# Patient Record
Sex: MALE | Race: OTHER | HISPANIC OR LATINO
[De-identification: names, ages, dates, MRNs, and addresses within clinical notes are randomized per-mention and may not be internally consistent; named-entity substitution may affect disease eponyms.]

---

## 2022-10-07 PROBLEM — Z00.00 ENCOUNTER FOR PREVENTIVE HEALTH EXAMINATION: Status: ACTIVE | Noted: 2022-10-07

## 2022-10-21 ENCOUNTER — APPOINTMENT (OUTPATIENT)
Dept: FAMILY MEDICINE | Facility: CLINIC | Age: 67
End: 2022-10-21

## 2024-03-08 ENCOUNTER — APPOINTMENT (OUTPATIENT)
Dept: GASTROENTEROLOGY | Facility: CLINIC | Age: 69
End: 2024-03-08

## 2024-09-05 ENCOUNTER — NON-APPOINTMENT (OUTPATIENT)
Age: 69
End: 2024-09-05

## 2024-09-06 ENCOUNTER — APPOINTMENT (OUTPATIENT)
Dept: FAMILY MEDICINE | Facility: CLINIC | Age: 69
End: 2024-09-06

## 2024-09-06 VITALS
OXYGEN SATURATION: 96 % | DIASTOLIC BLOOD PRESSURE: 66 MMHG | SYSTOLIC BLOOD PRESSURE: 109 MMHG | BODY MASS INDEX: 26.31 KG/M2 | HEIGHT: 62 IN | HEART RATE: 56 BPM | WEIGHT: 143 LBS | TEMPERATURE: 98.2 F

## 2024-09-06 DIAGNOSIS — Z87.438 PERSONAL HISTORY OF OTHER DISEASES OF MALE GENITAL ORGANS: ICD-10-CM

## 2024-09-06 DIAGNOSIS — N42.9 DISORDER OF PROSTATE, UNSPECIFIED: ICD-10-CM

## 2024-09-06 DIAGNOSIS — Z80.3 FAMILY HISTORY OF MALIGNANT NEOPLASM OF BREAST: ICD-10-CM

## 2024-09-06 DIAGNOSIS — Z12.11 ENCOUNTER FOR SCREENING FOR MALIGNANT NEOPLASM OF COLON: ICD-10-CM

## 2024-09-06 DIAGNOSIS — Z87.19 PERSONAL HISTORY OF OTHER DISEASES OF THE DIGESTIVE SYSTEM: ICD-10-CM

## 2024-09-06 DIAGNOSIS — Z00.01 ENCOUNTER FOR GENERAL ADULT MEDICAL EXAMINATION WITH ABNORMAL FINDINGS: ICD-10-CM

## 2024-09-06 DIAGNOSIS — K21.9 GASTRO-ESOPHAGEAL REFLUX DISEASE W/OUT ESOPHAGITIS: ICD-10-CM

## 2024-09-06 DIAGNOSIS — N64.4 MASTODYNIA: ICD-10-CM

## 2024-09-06 DIAGNOSIS — Z77.22 CONTACT WITH AND (SUSPECTED) EXPOSURE TO ENVIRONMENTAL TOBACCO SMOKE (ACUTE) (CHRONIC): ICD-10-CM

## 2024-09-06 PROCEDURE — 36415 COLL VENOUS BLD VENIPUNCTURE: CPT

## 2024-09-06 PROCEDURE — G0439: CPT

## 2024-09-06 RX ORDER — OMEPRAZOLE 40 MG/1
40 CAPSULE, DELAYED RELEASE ORAL
Qty: 30 | Refills: 3 | Status: ACTIVE | COMMUNITY
Start: 2024-09-06 | End: 1900-01-01

## 2024-09-06 NOTE — HISTORY OF PRESENT ILLNESS
[de-identified] : 68 years old male came to office to establish his care in our office.  Pt complained a year history of right breast enlargement, progressively getting worse. No pain or nipple discharge. Sister has breast cancer.  Pt also has acid reflux problems, taking prilosec OTC for it. Pt takes OTC Saw Palmetto 160 mg, 1 daily for prostate problems, frequency urination, helping him. Pt never saw urologist before.

## 2024-09-06 NOTE — HEALTH RISK ASSESSMENT
[Good] : ~his/her~  mood as  good [No] : In the past 12 months have you used drugs other than those required for medical reasons? No [No falls in past year] : Patient reported no falls in the past year [0] : 2) Feeling down, depressed, or hopeless: Not at all (0) [PHQ-2 Negative - No further assessment needed] : PHQ-2 Negative - No further assessment needed [Patient declined colonoscopy] : Patient declined colonoscopy [With Family] : lives with family [Employed] : employed [] :  [Fully functional (bathing, dressing, toileting, transferring, walking, feeding)] : Fully functional (bathing, dressing, toileting, transferring, walking, feeding) [Fully functional (using the telephone, shopping, preparing meals, housekeeping, doing laundry, using] : Fully functional and needs no help or supervision to perform IADLs (using the telephone, shopping, preparing meals, housekeeping, doing laundry, using transportation, managing medications and managing finances) [Designated Healthcare Proxy] : Designated healthcare proxy [Name: ___] : Health Care Proxy's Name: [unfilled]  [Relationship: ___] : Relationship: [unfilled] [Current] : Current [[id number]] : 0 [AdvancecareDate] : 9/6/24 [de-identified] : socially only in Summer

## 2024-09-06 NOTE — PHYSICAL EXAM
[No Acute Distress] : no acute distress [Well Nourished] : well nourished [Well Developed] : well developed [Well-Appearing] : well-appearing [Normal Sclera/Conjunctiva] : normal sclera/conjunctiva [PERRL] : pupils equal round and reactive to light [EOMI] : extraocular movements intact [Normal Outer Ear/Nose] : the outer ears and nose were normal in appearance [Normal Oropharynx] : the oropharynx was normal [No JVD] : no jugular venous distention [No Lymphadenopathy] : no lymphadenopathy [Supple] : supple [Thyroid Normal, No Nodules] : the thyroid was normal and there were no nodules present [No Respiratory Distress] : no respiratory distress  [No Accessory Muscle Use] : no accessory muscle use [Clear to Auscultation] : lungs were clear to auscultation bilaterally [Normal Rate] : normal rate  [Regular Rhythm] : with a regular rhythm [Normal S1, S2] : normal S1 and S2 [No Murmur] : no murmur heard [No Varicosities] : no varicosities [Pedal Pulses Present] : the pedal pulses are present [No Edema] : there was no peripheral edema [No Extremity Clubbing/Cyanosis] : no extremity clubbing/cyanosis [Soft] : abdomen soft [Non Tender] : non-tender [Non-distended] : non-distended [No Masses] : no abdominal mass palpated [No HSM] : no HSM [Normal Bowel Sounds] : normal bowel sounds [No CVA Tenderness] : no CVA  tenderness [No Spinal Tenderness] : no spinal tenderness [No Joint Swelling] : no joint swelling [Grossly Normal Strength/Tone] : grossly normal strength/tone [No Rash] : no rash [Coordination Grossly Intact] : coordination grossly intact [No Focal Deficits] : no focal deficits [Normal Gait] : normal gait [Deep Tendon Reflexes (DTR)] : deep tendon reflexes were 2+ and symmetric [Normal Affect] : the affect was normal [Normal Insight/Judgement] : insight and judgment were intact [de-identified] : Right breast: significantly enlarged, no mass palpated. Left breast wnl.

## 2024-09-06 NOTE — HISTORY OF PRESENT ILLNESS
[de-identified] : 68 years old male came to office to establish his care in our office.  Pt complained a year history of right breast enlargement, progressively getting worse. No pain or nipple discharge. Sister has breast cancer.  Pt also has acid reflux problems, taking prilosec OTC for it. Pt takes OTC Saw Palmetto 160 mg, 1 daily for prostate problems, frequency urination, helping him. Pt never saw urologist before.

## 2024-09-06 NOTE — REVIEW OF SYSTEMS
[Negative] : Heme/Lymph [Abdominal Pain] : no abdominal pain [Nausea] : no nausea [Diarrhea] : no diarrhea [Vomiting] : no vomiting [Heartburn] : heartburn [Incontinence] : no incontinence [Nocturia] : nocturia [Hematuria] : no hematuria [Frequency] : frequency

## 2024-09-06 NOTE — PHYSICAL EXAM
[No Acute Distress] : no acute distress [Well Nourished] : well nourished [Well Developed] : well developed [Well-Appearing] : well-appearing [Normal Sclera/Conjunctiva] : normal sclera/conjunctiva [PERRL] : pupils equal round and reactive to light [EOMI] : extraocular movements intact [Normal Outer Ear/Nose] : the outer ears and nose were normal in appearance [Normal Oropharynx] : the oropharynx was normal [No JVD] : no jugular venous distention [No Lymphadenopathy] : no lymphadenopathy [Supple] : supple [Thyroid Normal, No Nodules] : the thyroid was normal and there were no nodules present [No Respiratory Distress] : no respiratory distress  [No Accessory Muscle Use] : no accessory muscle use [Clear to Auscultation] : lungs were clear to auscultation bilaterally [Normal Rate] : normal rate  [Regular Rhythm] : with a regular rhythm [Normal S1, S2] : normal S1 and S2 [No Murmur] : no murmur heard [No Varicosities] : no varicosities [No Edema] : there was no peripheral edema [Pedal Pulses Present] : the pedal pulses are present [No Extremity Clubbing/Cyanosis] : no extremity clubbing/cyanosis [Soft] : abdomen soft [Non-distended] : non-distended [Non Tender] : non-tender [No Masses] : no abdominal mass palpated [No HSM] : no HSM [Normal Bowel Sounds] : normal bowel sounds [No CVA Tenderness] : no CVA  tenderness [No Spinal Tenderness] : no spinal tenderness [No Joint Swelling] : no joint swelling [Grossly Normal Strength/Tone] : grossly normal strength/tone [No Rash] : no rash [Coordination Grossly Intact] : coordination grossly intact [No Focal Deficits] : no focal deficits [Normal Gait] : normal gait [Deep Tendon Reflexes (DTR)] : deep tendon reflexes were 2+ and symmetric [Normal Affect] : the affect was normal [Normal Insight/Judgement] : insight and judgment were intact [de-identified] : Right breast: significantly enlarged, no mass palpated. Left breast wnl.

## 2024-09-20 ENCOUNTER — APPOINTMENT (OUTPATIENT)
Dept: FAMILY MEDICINE | Facility: CLINIC | Age: 69
End: 2024-09-20
Payer: MEDICARE

## 2024-09-20 DIAGNOSIS — Z23 ENCOUNTER FOR IMMUNIZATION: ICD-10-CM

## 2024-09-20 PROCEDURE — 90662 IIV NO PRSV INCREASED AG IM: CPT

## 2024-09-20 PROCEDURE — G0008: CPT

## 2024-09-20 NOTE — HISTORY OF PRESENT ILLNESS
[de-identified] : Pt came to office for flu shot. Pt had flu shot before, no adverse reaction to flu vaccine.

## 2024-09-26 ENCOUNTER — APPOINTMENT (OUTPATIENT)
Dept: FAMILY MEDICINE | Facility: CLINIC | Age: 69
End: 2024-09-26

## 2024-09-26 VITALS — SYSTOLIC BLOOD PRESSURE: 118 MMHG | TEMPERATURE: 98 F | DIASTOLIC BLOOD PRESSURE: 73 MMHG

## 2024-09-26 DIAGNOSIS — E03.9 HYPOTHYROIDISM, UNSPECIFIED: ICD-10-CM

## 2024-09-26 DIAGNOSIS — N18.9 CHRONIC KIDNEY DISEASE, UNSPECIFIED: ICD-10-CM

## 2024-09-26 DIAGNOSIS — M10.00 IDIOPATHIC GOUT, UNSPECIFIED SITE: ICD-10-CM

## 2024-09-26 DIAGNOSIS — E78.2 MIXED HYPERLIPIDEMIA: ICD-10-CM

## 2024-09-26 DIAGNOSIS — N63.10 UNSPECIFIED LUMP IN THE RIGHT BREAST, UNSPECIFIED QUADRANT: ICD-10-CM

## 2024-09-26 PROCEDURE — G2211 COMPLEX E/M VISIT ADD ON: CPT

## 2024-09-26 PROCEDURE — 99214 OFFICE O/P EST MOD 30 MIN: CPT

## 2024-09-26 RX ORDER — ALLOPURINOL 100 MG/1
100 TABLET ORAL DAILY
Qty: 30 | Refills: 3 | Status: ACTIVE | COMMUNITY
Start: 2024-09-26 | End: 1900-01-01

## 2024-09-26 RX ORDER — LEVOTHYROXINE SODIUM 0.03 MG/1
25 TABLET ORAL DAILY
Qty: 30 | Refills: 3 | Status: ACTIVE | COMMUNITY
Start: 2024-09-26 | End: 1900-01-01

## 2024-09-26 RX ORDER — COLCHICINE 0.6 MG/1
0.6 TABLET ORAL
Qty: 30 | Refills: 3 | Status: ACTIVE | COMMUNITY
Start: 2024-09-26 | End: 1900-01-01

## 2024-09-26 NOTE — HISTORY OF PRESENT ILLNESS
[de-identified] : 68 years old male with past medical history of BPH and GERD complained right breast pain. Mammogram and breast sonogram showed large mass identified within the right breast. It is beyond the field of view of mammogram. MRI of the breast with contrast recommended.  Pt also had blood and urine test results Creatinine was 1.63, GFR 45, total cholesterol 200, , , and TSH 8.12.  Results was reviewed with pt in details today. Other test results were wnl. Pt has frequent joint pain, most commonly at ankle area. The doctor [place] country used to give him Colchicine. Over here pt takes ibuprofen every day for pain, sometimes three times daily, other times once daily. Pt is have left ankle pain and swelling now.

## 2024-10-11 ENCOUNTER — OUTPATIENT (OUTPATIENT)
Dept: OUTPATIENT SERVICES | Facility: HOSPITAL | Age: 69
LOS: 1 days | End: 2024-10-11
Payer: MEDICARE

## 2024-10-11 ENCOUNTER — APPOINTMENT (OUTPATIENT)
Dept: MRI IMAGING | Facility: IMAGING CENTER | Age: 69
End: 2024-10-11

## 2024-10-11 DIAGNOSIS — N63.10 UNSPECIFIED LUMP IN THE RIGHT BREAST, UNSPECIFIED QUADRANT: ICD-10-CM

## 2024-10-11 PROCEDURE — 77048 MRI BREAST C-+ W/CAD UNI: CPT | Mod: 26,RT

## 2024-10-11 PROCEDURE — A9585: CPT

## 2024-10-11 PROCEDURE — C8905: CPT

## 2024-10-11 PROCEDURE — C8937: CPT

## 2024-10-15 ENCOUNTER — APPOINTMENT (OUTPATIENT)
Dept: FAMILY MEDICINE | Facility: CLINIC | Age: 69
End: 2024-10-15
Payer: MEDICARE

## 2024-10-15 DIAGNOSIS — R22.2 LOCALIZED SWELLING, MASS AND LUMP, TRUNK: ICD-10-CM

## 2024-10-15 PROCEDURE — 99441: CPT

## 2024-10-15 NOTE — HISTORY OF PRESENT ILLNESS
[de-identified] : MRI of breast with and without contrast showed right chest wall mass of uncertain etiology. The differential diagnosis includes, but is not limited to, benign etiology such as a hematoma, or a neoplastic process, either benign or malignant, including rhabdomyosarcoma. No MRI evidence of malignancy of the left breast. Ultrasound guided biopsy of the mass is recommended. Results were discussed with pt's daughter Joesph in details today.

## 2024-10-15 NOTE — END OF VISIT
[FreeTextEntry3] : Pt's daughter was fully explained pt's diagnosis, treatment plan and goal of treatment today on the phone for 10 minutes

## 2024-10-23 DIAGNOSIS — R92.8 OTHER ABNORMAL AND INCONCLUSIVE FINDINGS ON DIAGNOSTIC IMAGING OF BREAST: ICD-10-CM

## 2024-10-24 ENCOUNTER — APPOINTMENT (OUTPATIENT)
Dept: ULTRASOUND IMAGING | Facility: IMAGING CENTER | Age: 69
End: 2024-10-24
Payer: MEDICARE

## 2024-10-24 ENCOUNTER — APPOINTMENT (OUTPATIENT)
Dept: FAMILY MEDICINE | Facility: CLINIC | Age: 69
End: 2024-10-24

## 2024-10-24 ENCOUNTER — OUTPATIENT (OUTPATIENT)
Dept: OUTPATIENT SERVICES | Facility: HOSPITAL | Age: 69
LOS: 1 days | End: 2024-10-24
Payer: MEDICARE

## 2024-10-24 DIAGNOSIS — R22.2 LOCALIZED SWELLING, MASS AND LUMP, TRUNK: ICD-10-CM

## 2024-10-24 PROBLEM — R92.8 ABNORMAL MAMMOGRAM: Status: ACTIVE | Noted: 2024-10-24

## 2024-10-24 PROCEDURE — 88342 IMHCHEM/IMCYTCHM 1ST ANTB: CPT

## 2024-10-24 PROCEDURE — 76942 ECHO GUIDE FOR BIOPSY: CPT

## 2024-10-24 PROCEDURE — 88307 TISSUE EXAM BY PATHOLOGIST: CPT

## 2024-10-24 PROCEDURE — 88342 IMHCHEM/IMCYTCHM 1ST ANTB: CPT | Mod: 26

## 2024-10-24 PROCEDURE — 88307 TISSUE EXAM BY PATHOLOGIST: CPT | Mod: 26

## 2024-10-24 PROCEDURE — 88274 CYTOGENETICS 25-99: CPT

## 2024-10-24 PROCEDURE — 20206 BIOPSY MUSCLE PERQ NEEDLE: CPT

## 2024-10-24 PROCEDURE — 76942 ECHO GUIDE FOR BIOPSY: CPT | Mod: 26

## 2024-10-24 PROCEDURE — 88271 CYTOGENETICS DNA PROBE: CPT

## 2024-10-24 PROCEDURE — 88273 CYTOGENETICS 10-30: CPT

## 2024-11-04 LAB — CHROM ANALY INTERPHASE BLD FISH-IMP: SIGNIFICANT CHANGE UP

## 2024-11-15 ENCOUNTER — APPOINTMENT (OUTPATIENT)
Dept: FAMILY MEDICINE | Facility: CLINIC | Age: 69
End: 2024-11-15
Payer: MEDICARE

## 2024-11-15 DIAGNOSIS — C49.3 MALIGNANT NEOPLASM OF CONNECTIVE AND SOFT TISSUE OF THORAX: ICD-10-CM

## 2024-11-15 PROCEDURE — 99441: CPT

## 2024-11-15 NOTE — HISTORY OF PRESENT ILLNESS
[Home] : at home, [unfilled] , at the time of the visit. 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 Results were discussed with pt's daughter in details today

## 2024-11-26 ENCOUNTER — NON-APPOINTMENT (OUTPATIENT)
Age: 69
End: 2024-11-26

## 2024-11-26 ENCOUNTER — TRANSCRIPTION ENCOUNTER (OUTPATIENT)
Age: 69
End: 2024-11-26

## 2024-11-26 NOTE — DATA REVIEWED
[FreeTextEntry1] : 9/20/24 ([facility]) B/l dMMG/US- scattered parenchymal densities. In the left breast there is an at least 17 cm fat-containing mass noted. This is beyond the field-of-view of mammography and corresponds to the region of palpable concern.  US- Large mass greater than 12 cm is noted in the right breast. This corresponds to the region of palpable concern. This is difficult to accurately measure based on sonography. IMPRESSION:  Large mass identified within the right breast. This is beyond the field-of-view of mammography. MRI of the breast with contrast recommended. BI-RADS 0  10/11/24 ([facility]) MRI- Right chest wall 6 x 11 x 6 cm mass of uncertain etiology. The differential diagnosis includes, but is not limited to, benign etiology such as a hematoma, or a neoplastic process, either benign or malignant, including rhabdomyosarcoma. Rec US bx. 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

## 2024-11-26 NOTE — HISTORY OF PRESENT ILLNESS
[FreeTextEntry1] : Patient is a 68yo M, referred by Dr. 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 Patient initally palpated chest wall mass which promoted dx imaging performed 9/20/24 and showed large mass >12cm (difficult to measure based on US). 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  Denies prior breast surgeries or biopsies.?? Patient denies family history of breast cancer?? Denies famhx of ovarian cancer.?? Patient has not had genetic testing performed??

## 2024-11-26 NOTE — ASSESSMENT
[FreeTextEntry1] : Patient is a 70yo M here for initial evaluation of right chest wall well-differentiated liposarcoma. Patient initally palpated chest wall mass which promoted dx imaging performed 9/20/24 and showed large mass >12cm (difficult to measure based on US). 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

## 2024-11-27 ENCOUNTER — APPOINTMENT (OUTPATIENT)
Dept: CT IMAGING | Facility: HOSPITAL | Age: 69
End: 2024-11-27

## 2024-11-27 ENCOUNTER — NON-APPOINTMENT (OUTPATIENT)
Age: 69
End: 2024-11-27

## 2024-11-27 ENCOUNTER — APPOINTMENT (OUTPATIENT)
Dept: SURGICAL ONCOLOGY | Facility: CLINIC | Age: 69
End: 2024-11-27
Payer: MEDICARE

## 2024-11-27 ENCOUNTER — APPOINTMENT (OUTPATIENT)
Dept: MRI IMAGING | Facility: HOSPITAL | Age: 69
End: 2024-11-27

## 2024-11-27 VITALS
RESPIRATION RATE: 17 BRPM | HEART RATE: 62 BPM | DIASTOLIC BLOOD PRESSURE: 69 MMHG | SYSTOLIC BLOOD PRESSURE: 120 MMHG | HEIGHT: 62 IN | OXYGEN SATURATION: 97 % | WEIGHT: 143 LBS | BODY MASS INDEX: 26.31 KG/M2

## 2024-11-27 PROCEDURE — 99203 OFFICE O/P NEW LOW 30 MIN: CPT

## 2024-11-27 NOTE — HISTORY OF PRESENT ILLNESS
[de-identified] : Mr. Lc Garcia is a 69 year old male, referred by Dr. Jie Mao, regarding right chest wall liposarcoma. Pt is only Slovak speaking, prefers daughter to translate  Pt has a large palpable mass for 1.5 years. He noticed discomfort and small lump after doing pushups while in [facility]. The [facility] doctor told him he most likely pulled a muscle and as the mass grew, he did not seek medical care. He continues to complain of minimal pain. He has a family history of breast cancer in her sister who lives in [facility]. He is otherwise healthy.   DM/US on 9/20/2024 shows a large mass identified in the right breast. 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

## 2024-11-27 NOTE — ASSESSMENT
[FreeTextEntry1] : IMP: Lc is a 69 year old male who is newly diagnosed large right chest wall liposarcoma  PLAN: CT A/P ordered Plastics referral After CT is performed will schedule surgery and coordinate with Dr. Stovall and Dr. Clarke to perform right chest excision of liposarcoma.    All medical entries were at my, Dr. Enrico Wilson, direction. I have reviewed the chart and agree that the record accurately reflects my personal performance of the history, physical exam, assessment and plan. Our office Nurse Practitioner was present of the duration of the office visit.

## 2024-11-27 NOTE — HISTORY OF PRESENT ILLNESS
[de-identified] : Mr. Lc Garcia is a 69 year old male, referred by Dr. Jie Mao, regarding right chest wall liposarcoma. Pt is only Polish speaking, prefers daughter to translate  Pt has a large palpable mass for 1.5 years. He noticed discomfort and small lump after doing pushups while in [facility]. The [facility] doctor told him he most likely pulled a muscle and as the mass grew, he did not seek medical care. He continues to complain of minimal pain. He has a family history of breast cancer in her sister who lives in [facility]. He is otherwise healthy.   DM/US on 9/20/2024 shows a large mass identified in the right breast. 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

## 2024-11-27 NOTE — PHYSICAL EXAM
[Normal] : supple, no neck mass and thyroid not enlarged [Normal Neck Lymph Nodes] : normal neck lymph nodes  [Normal Supraclavicular Lymph Nodes] : normal supraclavicular lymph nodes [Normal Axillary Lymph Nodes] : normal axillary lymph nodes [Normal] : oriented to person, place and time, with appropriate affect [FreeTextEntry1] : SC present for exam  [de-identified] : Normal S1,S2. Regular rate and rhythm [de-identified] : Large soft right breast 10 cm mass [de-identified] : Clear breath sounds bilaterally with normal respiratory effort.

## 2024-11-27 NOTE — HISTORY OF PRESENT ILLNESS
[de-identified] : Mr. Lc Garcia is a 69 year old male, referred by Dr. Jie Mao, regarding right chest wall liposarcoma. Pt is only Frisian speaking, prefers daughter to translate  Pt has a large palpable mass for 1.5 years. He noticed discomfort and small lump after doing pushups while in [facility]. The [facility] doctor told him he most likely pulled a muscle and as the mass grew, he did not seek medical care. He continues to complain of minimal pain. He has a family history of breast cancer in her sister who lives in [facility]. He is otherwise healthy.   DM/US on 9/20/2024 shows a large mass identified in the right breast. 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

## 2024-11-27 NOTE — PHYSICAL EXAM
[Normal] : supple, no neck mass and thyroid not enlarged [Normal Neck Lymph Nodes] : normal neck lymph nodes  [Normal Supraclavicular Lymph Nodes] : normal supraclavicular lymph nodes [Normal Axillary Lymph Nodes] : normal axillary lymph nodes [Normal] : oriented to person, place and time, with appropriate affect [FreeTextEntry1] : SC present for exam  [de-identified] : Normal S1,S2. Regular rate and rhythm [de-identified] : Large soft right breast 10 cm mass [de-identified] : Clear breath sounds bilaterally with normal respiratory effort.

## 2024-11-27 NOTE — PHYSICAL EXAM
[Normal] : supple, no neck mass and thyroid not enlarged [Normal Neck Lymph Nodes] : normal neck lymph nodes  [Normal Supraclavicular Lymph Nodes] : normal supraclavicular lymph nodes [Normal Axillary Lymph Nodes] : normal axillary lymph nodes [Normal] : oriented to person, place and time, with appropriate affect [FreeTextEntry1] : SC present for exam  [de-identified] : Normal S1,S2. Regular rate and rhythm [de-identified] : Large soft right breast 10 cm mass [de-identified] : Clear breath sounds bilaterally with normal respiratory effort.

## 2024-12-02 ENCOUNTER — NON-APPOINTMENT (OUTPATIENT)
Age: 69
End: 2024-12-02

## 2024-12-02 ENCOUNTER — RESULT REVIEW (OUTPATIENT)
Age: 69
End: 2024-12-02

## 2024-12-03 ENCOUNTER — APPOINTMENT (OUTPATIENT)
Dept: BREAST CENTER | Facility: CLINIC | Age: 69
End: 2024-12-03

## 2024-12-03 ENCOUNTER — NON-APPOINTMENT (OUTPATIENT)
Age: 69
End: 2024-12-03

## 2024-12-05 ENCOUNTER — APPOINTMENT (OUTPATIENT)
Dept: BREAST CENTER | Facility: CLINIC | Age: 69
End: 2024-12-05

## 2024-12-05 ENCOUNTER — APPOINTMENT (OUTPATIENT)
Dept: PLASTIC SURGERY | Facility: CLINIC | Age: 69
End: 2024-12-05
Payer: MEDICARE

## 2024-12-05 ENCOUNTER — OUTPATIENT (OUTPATIENT)
Dept: OUTPATIENT SERVICES | Facility: HOSPITAL | Age: 69
LOS: 1 days | End: 2024-12-05
Payer: MEDICARE

## 2024-12-05 ENCOUNTER — APPOINTMENT (OUTPATIENT)
Dept: CT IMAGING | Facility: IMAGING CENTER | Age: 69
End: 2024-12-05
Payer: MEDICARE

## 2024-12-05 VITALS
WEIGHT: 143 LBS | TEMPERATURE: 98 F | DIASTOLIC BLOOD PRESSURE: 78 MMHG | HEIGHT: 62 IN | OXYGEN SATURATION: 98 % | HEART RATE: 64 BPM | BODY MASS INDEX: 26.31 KG/M2 | SYSTOLIC BLOOD PRESSURE: 126 MMHG

## 2024-12-05 DIAGNOSIS — C49.3 MALIGNANT NEOPLASM OF CONNECTIVE AND SOFT TISSUE OF THORAX: ICD-10-CM

## 2024-12-05 DIAGNOSIS — R22.2 LOCALIZED SWELLING, MASS AND LUMP, TRUNK: ICD-10-CM

## 2024-12-05 PROCEDURE — 74177 CT ABD & PELVIS W/CONTRAST: CPT

## 2024-12-05 PROCEDURE — 99204 OFFICE O/P NEW MOD 45 MIN: CPT

## 2024-12-05 PROCEDURE — 74177 CT ABD & PELVIS W/CONTRAST: CPT | Mod: 26

## 2024-12-08 NOTE — REASON FOR VISIT
[Consultation] : a consultation visit [Spouse] : spouse [Family Member] : family member [FreeTextEntry1] : Dr. Wilson

## 2024-12-08 NOTE — HISTORY OF PRESENT ILLNESS
[FreeTextEntry1] : REFUGIO ALCANTARA is a 69 year old M who presents for initial consultation for reconstructive options after planned excision of right chest wall liposarcoma.   Patient is accompanied by his wife and daughter.  Patient reports first noticing a palpable mass to the right chest wall approximately 1 year ago, however in the past few months it has significantly grown in size. He initially saw a doctor in [facility] [place]) and was recommended conservative management. Patient denies trauma or injury to the area, denies tenderness or pain, no bleeding or drainage. At this time, denies cp, sob, subjective fever, chills, headache, cough, myalgia, malaise, abd pain, n/v/d, decreased appetite, and generalized weakness. diagnostic mammo/sono done 9/20/24 notable for large mass identified in the right breast, BIRADS 0. MRI breast done 10/11/24 notable for 16 x 11 x 6cm mass in the right chest wall of uncertain etiology. s/p US soft tissue biopsy 10/24/24, path notable for atypical lipomatous tumor/well-differentiated liposarcoma. MDM2 posistive.  Patient was evaluated by surg/onc Dr. Wilson, who recommended surgical excision.   PMHx: BPH, GERD, gout, hypothyroidism PSHx: appendectomy, cholecystectomy (1998) Family hx: sister +breast cancer Allergies: NKDA Current meds: allopurinol, colchicine, levothyroxine, omeprazole  Social hx: occasional ETOH, cigarette smoker

## 2024-12-08 NOTE — ADDENDUM
[FreeTextEntry1] :  This note was authored by Mariah Marrufo working as a scribe for Dr.Victor Clarke. I, Dr. Vitaliy Clarke have reviewed the content of this note and confirm it is true and accurate. I personally performed the history and physical examination and made all the decisions 12/05/2024   I, Clement Clarke NP, am scribing for and in the presence of Dr. Vitaliy Clarke MD, the following sections: HISTORY OF PRESENT ILLNESS, PAST MEDICAL/FAMILY/SOCIAL HISTORY, REVIEW OF SYSTEMS, VITRAL SIGNS, PHYSICAL EXAM, & DISPOSITION.  I personally performed the services described in the documentation, reviewed the documentation recorded by the scribe in my presence and it accurately and completely records my words and actions.

## 2024-12-08 NOTE — HISTORY OF PRESENT ILLNESS
[FreeTextEntry1] : REFUGIO ALCANTARA is a 69 year old M who presents for initial consultation for reconstructive options after planned excision of right chest wall liposarcoma.   Patient is accompanied by his wife and daughter.  Patient reports first noticing a palpable mass to the right chest wall approximately 1 year ago, however in the past few months it has significantly grown in size. He initially saw a doctor in [facility] ([place] country) and was recommended conservative management. Patient denies trauma or injury to the area, denies tenderness or pain, no bleeding or drainage. At this time, denies cp, sob, subjective fever, chills, headache, cough, myalgia, malaise, abd pain, n/v/d, decreased appetite, and generalized weakness. diagnostic mammo/sono done 9/20/24 notable for large mass identified in the right breast, BIRADS 0. MRI breast done 10/11/24 notable for 16 x 11 x 6cm mass in the right chest wall of uncertain etiology. s/p US soft tissue biopsy 10/24/24, path notable for atypical lipomatous tumor/well-differentiated liposarcoma. MDM2 posistive.  Patient was evaluated by surg/onc Dr. Wilson, who recommended surgical excision.   PMHx: BPH, GERD, gout, hypothyroidism PSHx: appendectomy, cholecystectomy (1998) Family hx: sister +breast cancer Allergies: NKDA Current meds: allopurinol, colchicine, levothyroxine, omeprazole  Social hx: occasional ETOH, cigarette smoker

## 2024-12-08 NOTE — HISTORY OF PRESENT ILLNESS
[FreeTextEntry1] : REFUGIO ALCANTARA is a 69 year old M who presents for initial consultation for reconstructive options after planned excision of right chest wall liposarcoma.   Patient is accompanied by his wife and daughter.  Patient reports first noticing a palpable mass to the right chest wall approximately 1 year ago, however in the past few months it has significantly grown in size. He initially saw a doctor in [facility] ([place]) and was recommended conservative management. Patient denies trauma or injury to the area, denies tenderness or pain, no bleeding or drainage. At this time, denies cp, sob, subjective fever, chills, headache, cough, myalgia, malaise, abd pain, n/v/d, decreased appetite, and generalized weakness. diagnostic mammo/sono done 9/20/24 notable for large mass identified in the right breast, BIRADS 0. MRI breast done 10/11/24 notable for 16 x 11 x 6cm mass in the right chest wall of uncertain etiology. s/p US soft tissue biopsy 10/24/24, path notable for atypical lipomatous tumor/well-differentiated liposarcoma. MDM2 posistive.  Patient was evaluated by surg/onc Dr. Wilson, who recommended surgical excision.   PMHx: BPH, GERD, gout, hypothyroidism PSHx: appendectomy, cholecystectomy (1998) Family hx: sister +breast cancer Allergies: NKDA Current meds: allopurinol, colchicine, levothyroxine, omeprazole  Social hx: occasional ETOH, cigarette smoker

## 2024-12-08 NOTE — HISTORY OF PRESENT ILLNESS
[FreeTextEntry1] : REFUGIO ALCANTARA is a 69 year old M who presents for initial consultation for reconstructive options after planned excision of right chest wall liposarcoma.   Patient is accompanied by his wife and daughter.  Patient reports first noticing a palpable mass to the right chest wall approximately 1 year ago, however in the past few months it has significantly grown in size. He initially saw a doctor in [facility] ([place]) and was recommended conservative management. Patient denies trauma or injury to the area, denies tenderness or pain, no bleeding or drainage. At this time, denies cp, sob, subjective fever, chills, headache, cough, myalgia, malaise, abd pain, n/v/d, decreased appetite, and generalized weakness. diagnostic mammo/sono done 9/20/24 notable for large mass identified in the right breast, BIRADS 0. MRI breast done 10/11/24 notable for 16 x 11 x 6cm mass in the right chest wall of uncertain etiology. s/p US soft tissue biopsy 10/24/24, path notable for atypical lipomatous tumor/well-differentiated liposarcoma. MDM2 posistive.  Patient was evaluated by surg/onc Dr. Wilson, who recommended surgical excision.   PMHx: BPH, GERD, gout, hypothyroidism PSHx: appendectomy, cholecystectomy (1998) Family hx: sister +breast cancer Allergies: NKDA Current meds: allopurinol, colchicine, levothyroxine, omeprazole  Social hx: occasional ETOH, cigarette smoker

## 2024-12-08 NOTE — ASSESSMENT
[FreeTextEntry1] : REFUGIO ALCANTARA is a 69 year old M who presents for initial consultation for reconstructive options after planned excision of right chest wall liposarcoma.  Exam findings and imaging reviewed with patient and his family members present.  I explained that following excision there will be a full thickness defect of the involved area.  The reconstructive options will be based on the defect size and surrounding tissue laxity of the involved area.  Primary closure is only possible for smaller defects.  For larger defects, local tissue rearrangement or skin grafting may be necessary.  The patient was explained the risks of each option. Risks following layered primary closure or local tissue rearrangement include wound dehiscence, contour irregularity, bleeding, infection, and paraesthesias.  Risks following skin grafting include wound dehiscence, skin graft nonadherence (partial or complete), contour irregularity, bleeding, infection, paraesthesias, and donor site complications.  Will discuss with Dr. Wilson the extent of liposarcoma involvement and whether chest wall skin can be salvaged.  Pending above discussion, re-evaluate for possible free tissue transfer for reconstruction vs other.  All questions answered.   - Will discuss with Dr. Wilson

## 2024-12-08 NOTE — HISTORY OF PRESENT ILLNESS
[FreeTextEntry1] : REFUGIO ALCANTARA is a 69 year old M who presents for initial consultation for reconstructive options after planned excision of right chest wall liposarcoma.   Patient is accompanied by his wife and daughter.  Patient reports first noticing a palpable mass to the right chest wall approximately 1 year ago, however in the past few months it has significantly grown in size. He initially saw a doctor in [facility] [place] and was recommended conservative management. Patient denies trauma or injury to the area, denies tenderness or pain, no bleeding or drainage. At this time, denies cp, sob, subjective fever, chills, headache, cough, myalgia, malaise, abd pain, n/v/d, decreased appetite, and generalized weakness. diagnostic mammo/sono done 9/20/24 notable for large mass identified in the right breast, BIRADS 0. MRI breast done 10/11/24 notable for 16 x 11 x 6cm mass in the right chest wall of uncertain etiology. s/p US soft tissue biopsy 10/24/24, path notable for atypical lipomatous tumor/well-differentiated liposarcoma. MDM2 posistive.  Patient was evaluated by surg/onc Dr. Wilson, who recommended surgical excision.   PMHx: BPH, GERD, gout, hypothyroidism PSHx: appendectomy, cholecystectomy (1998) Family hx: sister +breast cancer Allergies: NKDA Current meds: allopurinol, colchicine, levothyroxine, omeprazole  Social hx: occasional ETOH, cigarette smoker

## 2024-12-08 NOTE — PHYSICAL EXAM
[TextEntry] : Right chest: significant sized mass encompassing the entire chest wall, nontender to light and deep palpation, no regional adenopathy, no skin changes  Constitutional: NAD, well-nourished HENT: Normocephalic, atraumatic, PERRL, non-icteric sclerae, neck supple, trachea midline PULM: LSCTAB, no wheezing or rhonchi CV: RRR, no murmur, rubs, or gallops Abd: Soft, NT, ND, +BS, no palpable masses or bulge MSK: HAND Skin: No rash noted Neuro: A&O x 3, no FND Psych: Mood is appropriate

## 2024-12-08 NOTE — CONSULT LETTER
[Dear  ___] : Dear  [unfilled], [Consult Letter:] : I had the pleasure of evaluating your patient, [unfilled]. [Please see my note below.] : Please see my note below. [Consult Closing:] : Thank you very much for allowing me to participate in the care of this patient.  If you have any questions, please do not hesitate to contact me. [Sincerely,] : Sincerely, [( Thank you for referring [unfilled] for consultation for _____ )] : Thank you for referring [unfilled] for consultation for [unfilled] [FreeTextEntry3] : Vitaliy Clarke MD

## 2024-12-10 ENCOUNTER — APPOINTMENT (OUTPATIENT)
Dept: CT IMAGING | Facility: IMAGING CENTER | Age: 69
End: 2024-12-10
Payer: MEDICARE

## 2024-12-10 ENCOUNTER — OUTPATIENT (OUTPATIENT)
Dept: OUTPATIENT SERVICES | Facility: HOSPITAL | Age: 69
LOS: 1 days | End: 2024-12-10
Payer: MEDICARE

## 2024-12-10 DIAGNOSIS — C49.3 MALIGNANT NEOPLASM OF CONNECTIVE AND SOFT TISSUE OF THORAX: ICD-10-CM

## 2024-12-10 PROCEDURE — 71260 CT THORAX DX C+: CPT

## 2024-12-10 PROCEDURE — 71260 CT THORAX DX C+: CPT | Mod: 26

## 2024-12-17 ENCOUNTER — APPOINTMENT (OUTPATIENT)
Dept: THORACIC SURGERY | Facility: CLINIC | Age: 69
End: 2024-12-17
Payer: MEDICARE

## 2024-12-17 VITALS
HEART RATE: 64 BPM | WEIGHT: 145 LBS | OXYGEN SATURATION: 96 % | DIASTOLIC BLOOD PRESSURE: 74 MMHG | BODY MASS INDEX: 25.69 KG/M2 | HEIGHT: 63 IN | SYSTOLIC BLOOD PRESSURE: 125 MMHG | RESPIRATION RATE: 16 BRPM

## 2024-12-17 DIAGNOSIS — C49.3 MALIGNANT NEOPLASM OF CONNECTIVE AND SOFT TISSUE OF THORAX: ICD-10-CM

## 2024-12-17 PROCEDURE — 99204 OFFICE O/P NEW MOD 45 MIN: CPT

## 2024-12-17 NOTE — ASSESSMENT
[FreeTextEntry1] : Mr. REFUGIO ALCANTARA, 69 year old male, former social smoker quit 2023, w/ hx of BPH, GERD, gout, hypothyroidism Now, w/ right chest wall liposarcoma. Known for approximately 1 year, however has significantly enlarged within the past few months.  MR Breast w/wo IV contrast, right w/ CAD on 10/11/24:  - Right chest wall mass of uncertain etiology. The differential diagnosis includes, but is not limited to, benign etiology such as a hematoma, or a neoplastic process, either benign or malignant, including rhabdomyosarcoma.   US Biopsy of right chest wall mass on 10/24/24. Path: Atypical lipomatous tumor/well-differentiated liposarcoma. FISH result: Positive.   CT Chest with IV Contrast on 12/10/24:  - 6 x 7.3 x 17.3 cm heterogeneous fat-containing mass located deep to the right pectoralis major muscle compatible with liposarcoma. (2/34, 602/56).  - Multiple adjacent subcentimeter axillary lymph nodes are noted, similar to contralateral left axilla lymph nodes - Adjacent pectoralis minor muscle, vascular structures and ribs are intact..  OF NOTE:  Patient was evaluated by surg/onc Dr. Wilson, who recommended surgical excision. Consulted Dr. Clarke on 12/5  I have reviewed the patient's medical records and diagnostic images at time of this office consultation and have made the following recommendation: 1. Imaging and path reviewed. On PE large chest wall mass noted to his right upper chest area. Recommended right chest wall resection with Dr. Wilson. Will be on standby for surgery. 2. Pre-op clearances by Dr. Wilson.  I, GHASSAN MeyerIM, personally performed the evaluation and management (E/M) services for this new patient. That E/M includes conducting the initial examination, assessing all conditions, and establishing the plan of care. Today, my ACP, EBENEZER Chaney was here to observe my evaluation and management services for this patient to be followed going forward.

## 2024-12-17 NOTE — PHYSICAL EXAM
[Fully active, able to carry on all pre-disease performance without restriction] : Status 0 - Fully active, able to carry on all pre-disease performance without restriction [General Appearance - Alert] : alert [General Appearance - In No Acute Distress] : in no acute distress [Sclera] : the sclera and conjunctiva were normal [Hearing Threshold Finger Rub Not [place]] : hearing was normal [Outer Ear] : the ears and nose were normal in appearance [Neck Appearance] : the appearance of the neck was normal [Auscultation Breath Sounds / Voice Sounds] : lungs were clear to auscultation bilaterally [Heart Rate And Rhythm] : heart rate was normal and rhythm regular [2+] : left 2+ [Bowel Sounds] : normal bowel sounds [Abdomen Soft] : soft [Cervical Lymph Nodes Enlarged Posterior Bilaterally] : posterior cervical [Cervical Lymph Nodes Enlarged Anterior Bilaterally] : anterior cervical [No CVA Tenderness] : no ~M costovertebral angle tenderness [No Spinal Tenderness] : no spinal tenderness [Abnormal Walk] : normal gait [Nail Clubbing] : no clubbing  or cyanosis of the fingernails [Musculoskeletal - Swelling] : no joint swelling seen [Motor Tone] : muscle strength and tone were normal [Skin Color & Pigmentation] : normal skin color and pigmentation [Skin Turgor] : normal skin turgor [] : no rash [Deep Tendon Reflexes (DTR)] : deep tendon reflexes were 2+ and symmetric [Sensation] : the sensory exam was normal to light touch and pinprick [No Focal Deficits] : no focal deficits [FreeTextEntry1] : right chest wall mass

## 2024-12-17 NOTE — HISTORY OF PRESENT ILLNESS
[FreeTextEntry1] : Mr. REFUGIO ALCANTARA, 69 year old male, former social smoker quit 2023, w/ hx of BPH, GERD, gout, hypothyroidism Now, w/ right chest wall liposarcoma. Known for approximately 1 year, however has significantly enlarged within the past few months.  MR Breast w/wo IV contrast, right w/ CAD on 10/11/24:  - Right chest wall mass of uncertain etiology. The differential diagnosis includes, but is not limited to, benign etiology such as a hematoma, or a neoplastic process, either benign or malignant, including rhabdomyosarcoma.   US Biopsy of right chest wall mass on 10/24/24. Path: Atypical lipomatous tumor/well-differentiated liposarcoma. FISH result: Positive.   CT Chest with IV Contrast on 12/10/24:  - 6 x 7.3 x 17.3 cm heterogeneous fat-containing mass located deep to the right pectoralis major muscle compatible with liposarcoma. (2/34, 602/56).  - Multiple adjacent subcentimeter axillary lymph nodes are noted, similar to contralateral left axilla lymph nodes - Adjacent pectoralis minor muscle, vascular structures and ribs are intact..  OF NOTE:  Patient was evaluated by surg/onc Dr. Wilson, who recommended surgical excision. Consulted Dr. Clarke on 12/5  Patient presents today for second opinion. Patient reports that he got hit by a ball to the right chest area while playing soccer 3-4 mons ago, denies tenderness or pain, no bleeding or drainage.

## 2024-12-17 NOTE — PHYSICAL EXAM
[Fully active, able to carry on all pre-disease performance without restriction] : Status 0 - Fully active, able to carry on all pre-disease performance without restriction [General Appearance - Alert] : alert [General Appearance - In No Acute Distress] : in no acute distress [Sclera] : the sclera and conjunctiva were normal [Outer Ear] : the ears and nose were normal in appearance [Hearing Threshold Finger Rub Not [place]] : hearing was normal [Neck Appearance] : the appearance of the neck was normal [Auscultation Breath Sounds / Voice Sounds] : lungs were clear to auscultation bilaterally [Heart Rate And Rhythm] : heart rate was normal and rhythm regular [2+] : left 2+ [Bowel Sounds] : normal bowel sounds [Abdomen Soft] : soft [Cervical Lymph Nodes Enlarged Posterior Bilaterally] : posterior cervical [Cervical Lymph Nodes Enlarged Anterior Bilaterally] : anterior cervical [No CVA Tenderness] : no ~M costovertebral angle tenderness [No Spinal Tenderness] : no spinal tenderness [Abnormal Walk] : normal gait [Nail Clubbing] : no clubbing  or cyanosis of the fingernails [Musculoskeletal - Swelling] : no joint swelling seen [Motor Tone] : muscle strength and tone were normal [Skin Color & Pigmentation] : normal skin color and pigmentation [Skin Turgor] : normal skin turgor [] : no rash [Deep Tendon Reflexes (DTR)] : deep tendon reflexes were 2+ and symmetric [Sensation] : the sensory exam was normal to light touch and pinprick [No Focal Deficits] : no focal deficits [FreeTextEntry1] : right chest wall mass

## 2025-01-06 ENCOUNTER — APPOINTMENT (OUTPATIENT)
Dept: PLASTIC SURGERY | Facility: CLINIC | Age: 70
End: 2025-01-06
Payer: MEDICARE

## 2025-01-06 VITALS
WEIGHT: 136 LBS | HEART RATE: 63 BPM | DIASTOLIC BLOOD PRESSURE: 74 MMHG | SYSTOLIC BLOOD PRESSURE: 131 MMHG | TEMPERATURE: 98.2 F | HEIGHT: 62.5 IN | OXYGEN SATURATION: 99 % | BODY MASS INDEX: 24.4 KG/M2

## 2025-01-06 DIAGNOSIS — R22.2 LOCALIZED SWELLING, MASS AND LUMP, TRUNK: ICD-10-CM

## 2025-01-06 PROCEDURE — 99213 OFFICE O/P EST LOW 20 MIN: CPT

## 2025-01-06 NOTE — ASSESSMENT
[FreeTextEntry1] : REFUGIO ALCANTARA is a 69 year old M who presents for follow up visit for reconstructive options after planned excision of right chest wall liposarcoma.  Patient presents for pre-op eval for reconstruction of right chest wall, possible local flap, possible skin graft, possible integra.   Risks, benefits, and alternatives to surgery were reviewed and routine gretel-operative course described, which include but are not limited to infection, bleeding, recurrence, seroma, asymmetry, deformity, scarring, paresthesia, wound dehiscence, etc. Patient expressed understanding and wishes to proceed. Will proceed with surgical planning.  - Plan for reconstruction of right chest wall, possible local flap, possible skin graft, possible integra, tentatively 1/13/25 - Our office will coordinate with [facility] - Surgical paperwork submitted

## 2025-01-06 NOTE — ADDENDUM
[FreeTextEntry1] : I, Clement Clarke, NP, am scribing for and in the presence of Dr. Vitaliy Clarke MD, the following sections: HISTORY OF PRESENT ILLNESS, PAST MEDICAL/FAMILY/SOCIAL HISTORY, REVIEW OF SYSTEMS, VITAL SIGNS, PHYSICAL EXAM, & DISPOSITION.   I personally performed the services described in the documentation, reviewed the documentation recorded by the NP/scribe in my presence and it accurately and completely records my words and actions.

## 2025-01-06 NOTE — HISTORY OF PRESENT ILLNESS
[FreeTextEntry1] : REFUGIO ALCANTARA is a 69 year old M who presents for follow up visit for reconstructive options after planned excision of right chest wall liposarcoma.  Patient presents for pre-op eval for reconstruction of right chest wall, possible local flap, possible skin graft, possible integra.   Patient is accompanied by his wife, Dawna, and his mother.  Patient had a CT chest w/ IV contrast 12/10/24, notable for 6 x 7.3 x 17x3cm heterogeneous fat-containing mass located deep to the right pectoralis major muscle c/w liposarcoma, multiple adjacent subcentimeter axillary LN are noted, similar to contralateral left axilla lymph nodes, adjacent pectoralis minor muscle, vascular structures, and ribs are intact.  Patient is planned for surgery 1/13/25, offers no acute complaints, denies cp, sob, subjective fever, chills, headache, cough, myalgia, malaise, abd pain, n/v/d, decreased appetite, and generalized weakness.

## 2025-01-08 ENCOUNTER — APPOINTMENT (OUTPATIENT)
Dept: CARDIOLOGY | Facility: CLINIC | Age: 70
End: 2025-01-08
Payer: MEDICARE

## 2025-01-08 ENCOUNTER — APPOINTMENT (OUTPATIENT)
Dept: FAMILY MEDICINE | Facility: CLINIC | Age: 70
End: 2025-01-08
Payer: MEDICARE

## 2025-01-08 ENCOUNTER — NON-APPOINTMENT (OUTPATIENT)
Age: 70
End: 2025-01-08

## 2025-01-08 ENCOUNTER — LABORATORY RESULT (OUTPATIENT)
Age: 70
End: 2025-01-08

## 2025-01-08 VITALS
BODY MASS INDEX: 24.22 KG/M2 | SYSTOLIC BLOOD PRESSURE: 118 MMHG | RESPIRATION RATE: 16 BRPM | HEIGHT: 62.5 IN | OXYGEN SATURATION: 97 % | TEMPERATURE: 97.8 F | DIASTOLIC BLOOD PRESSURE: 66 MMHG | HEART RATE: 68 BPM | WEIGHT: 135 LBS

## 2025-01-08 VITALS
SYSTOLIC BLOOD PRESSURE: 118 MMHG | OXYGEN SATURATION: 97 % | TEMPERATURE: 97.8 F | HEART RATE: 68 BPM | BODY MASS INDEX: 24.22 KG/M2 | WEIGHT: 135 LBS | DIASTOLIC BLOOD PRESSURE: 67 MMHG | HEIGHT: 62.5 IN

## 2025-01-08 DIAGNOSIS — Z01.810 ENCOUNTER FOR PREPROCEDURAL CARDIOVASCULAR EXAMINATION: ICD-10-CM

## 2025-01-08 DIAGNOSIS — R94.31 ABNORMAL ELECTROCARDIOGRAM [ECG] [EKG]: ICD-10-CM

## 2025-01-08 PROCEDURE — 99214 OFFICE O/P EST MOD 30 MIN: CPT

## 2025-01-08 PROCEDURE — 99204 OFFICE O/P NEW MOD 45 MIN: CPT

## 2025-01-08 PROCEDURE — 93000 ELECTROCARDIOGRAM COMPLETE: CPT | Mod: 59

## 2025-01-08 PROCEDURE — 93306 TTE W/DOPPLER COMPLETE: CPT

## 2025-01-08 PROCEDURE — 36415 COLL VENOUS BLD VENIPUNCTURE: CPT

## 2025-01-08 NOTE — HISTORY OF PRESENT ILLNESS
[FreeTextEntry1] : Removal right chest wall tumor [FreeTextEntry2] : 1/13/25 [FreeTextEntry4] : 68 years old male with past medical history of BPH, hyperlipidemia, and GERD came back for pre-surgical evaluation.

## 2025-01-09 ENCOUNTER — APPOINTMENT (OUTPATIENT)
Dept: CV DIAGNOSTICS | Facility: HOSPITAL | Age: 70
End: 2025-01-09

## 2025-01-09 ENCOUNTER — RESULT REVIEW (OUTPATIENT)
Age: 70
End: 2025-01-09

## 2025-01-09 ENCOUNTER — NON-APPOINTMENT (OUTPATIENT)
Age: 70
End: 2025-01-09

## 2025-01-09 ENCOUNTER — OUTPATIENT (OUTPATIENT)
Dept: OUTPATIENT SERVICES | Facility: HOSPITAL | Age: 70
LOS: 1 days | End: 2025-01-09

## 2025-01-09 ENCOUNTER — OUTPATIENT (OUTPATIENT)
Dept: OUTPATIENT SERVICES | Facility: HOSPITAL | Age: 70
LOS: 1 days | End: 2025-01-09
Payer: MEDICARE

## 2025-01-09 VITALS
TEMPERATURE: 97 F | HEIGHT: 61.5 IN | OXYGEN SATURATION: 99 % | WEIGHT: 136.03 LBS | HEART RATE: 62 BPM | DIASTOLIC BLOOD PRESSURE: 76 MMHG | RESPIRATION RATE: 16 BRPM | SYSTOLIC BLOOD PRESSURE: 116 MMHG

## 2025-01-09 DIAGNOSIS — E03.9 HYPOTHYROIDISM, UNSPECIFIED: ICD-10-CM

## 2025-01-09 DIAGNOSIS — C49.3 MALIGNANT NEOPLASM OF CONNECTIVE AND SOFT TISSUE OF THORAX: ICD-10-CM

## 2025-01-09 DIAGNOSIS — R94.31 ABNORMAL ELECTROCARDIOGRAM [ECG] [EKG]: ICD-10-CM

## 2025-01-09 DIAGNOSIS — Z01.810 ENCOUNTER FOR PREPROCEDURAL CARDIOVASCULAR EXAMINATION: ICD-10-CM

## 2025-01-09 LAB
BLD GP AB SCN SERPL QL: NEGATIVE — SIGNIFICANT CHANGE UP
RH IG SCN BLD-IMP: POSITIVE — SIGNIFICANT CHANGE UP
RH IG SCN BLD-IMP: POSITIVE — SIGNIFICANT CHANGE UP

## 2025-01-09 PROCEDURE — 93018 CV STRESS TEST I&R ONLY: CPT | Mod: GC

## 2025-01-09 PROCEDURE — 93016 CV STRESS TEST SUPVJ ONLY: CPT | Mod: GC

## 2025-01-09 PROCEDURE — 78451 HT MUSCLE IMAGE SPECT SING: CPT | Mod: 26

## 2025-01-09 NOTE — H&P PST ADULT - PROBLEM SELECTOR PLAN 1
Scheduled excision right chest wall liposarcoma.  Dr Preston: resection right chest wall, possible local flap, possible skin graft, possible integra.  Dr Ulrich: on standby for right chest wall resection.  Written & verbal preop instructions, gi prophylaxis & surgical soap given  Pt verbalized good understanding.  Teach back done on surgical soap instructions. Scheduled excision right chest wall liposarcoma.  Dr Preston: resection right chest wall, possible local flap, possible skin graft, possible integra.  Dr Ulrich: on standby for right chest wall resection.  Written & verbal preop instructions, gi prophylaxis & surgical soap given  Pt verbalized good understanding.  Teach back done on surgical soap instructions.  Abnormal EKG with PCP referred to cardiology for eval. (alscript)  Pending copy of eval note

## 2025-01-09 NOTE — H&P PST ADULT - HISTORY OF PRESENT ILLNESS
69 year old Hebrew speaking male with pm hx of BPH, GERD, gout, hypothyroidism presents for preop eval for scheduled excision right chest wall liposarcoma.  Dr Preston: resection right chest wall, possible local flap, possible skin graft, possible integra.  Dr Ulrich: on standby for right chest wall resection.  Mammogram & sonogram done in 9/2024 abnormal right breast findings MRI & Biopsy done in October 2024.  As per Dr Ulrich note:  US Biopsy of right chest wall mass on 10/24/24. Path: Atypical lipomatous tumor/well-differentiated liposarcoma. FISH result: Positive.  CT Chest with IV Contrast on 12/10/24: CT Chest with IV Contrast on 12/10/24:  - 6 x 7.3 x 17.3 cm heterogeneous fat-containing mass located deep to the right pectoralis major muscle compatible with liposarcoma. (2/34, 602/56).  - Multiple adjacent subcentimeter axillary lymph nodes are noted, similar to contralateral left axilla lymph nodes

## 2025-01-09 NOTE — H&P PST ADULT - PROBLEM SELECTOR PLAN 2
Pt instructed to take levothyroxine on morning of surgery. Pt instructed to take levothyroxine on morning of surgery.  1/2025 cbc, bmp wnl (alscript) t&s done today

## 2025-01-09 NOTE — H&P PST ADULT - NSANTHOSAYNRD_GEN_A_CORE
No. LILLIE screening performed.  STOP BANG Legend: 0-2 = LOW Risk; 3-4 = INTERMEDIATE Risk; 5-8 = HIGH Risk

## 2025-01-09 NOTE — H&P PST ADULT - NEGATIVE GENERAL GENITOURINARY SYMPTOMS
no renal colic/no flank pain L/no flank pain R/no bladder infections/no dysuria/no urinary hesitancy/normal urinary frequency

## 2025-01-09 NOTE — H&P PST ADULT - NSICDXPASTMEDICALHX_GEN_ALL_CORE_FT
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

## 2025-01-10 ENCOUNTER — APPOINTMENT (OUTPATIENT)
Dept: FAMILY MEDICINE | Facility: CLINIC | Age: 70
End: 2025-01-10
Payer: MEDICARE

## 2025-01-10 DIAGNOSIS — M10.00 IDIOPATHIC GOUT, UNSPECIFIED SITE: ICD-10-CM

## 2025-01-10 DIAGNOSIS — E03.9 HYPOTHYROIDISM, UNSPECIFIED: ICD-10-CM

## 2025-01-10 DIAGNOSIS — N18.9 CHRONIC KIDNEY DISEASE, UNSPECIFIED: ICD-10-CM

## 2025-01-10 DIAGNOSIS — Z01.818 ENCOUNTER FOR OTHER PREPROCEDURAL EXAMINATION: ICD-10-CM

## 2025-01-10 LAB
ALBUMIN SERPL ELPH-MCNC: 4.5 G/DL
ALP BLD-CCNC: 102 U/L
ALT SERPL-CCNC: 19 U/L
ANION GAP SERPL CALC-SCNC: 12 MMOL/L
APPEARANCE: CLEAR
AST SERPL-CCNC: 24 U/L
BASOPHILS # BLD AUTO: 0.04 K/UL — SIGNIFICANT CHANGE UP (ref 0–0.2)
BASOPHILS # BLD AUTO: 0.05 K/UL
BASOPHILS NFR BLD AUTO: 0.6 % — SIGNIFICANT CHANGE UP (ref 0–2)
BASOPHILS NFR BLD AUTO: 0.7 %
BILIRUB SERPL-MCNC: 0.7 MG/DL
BILIRUBIN URINE: NEGATIVE
BLOOD URINE: NEGATIVE
BUN SERPL-MCNC: 20 MG/DL
CALCIUM SERPL-MCNC: 9.9 MG/DL
CHLORIDE SERPL-SCNC: 107 MMOL/L
CO2 SERPL-SCNC: 24 MMOL/L
COLOR: YELLOW
CREAT SERPL-MCNC: 1.62 MG/DL
EGFR: 45 ML/MIN/1.73M2
EOSINOPHIL # BLD AUTO: 0.16 K/UL
EOSINOPHIL # BLD AUTO: 0.18 K/UL — SIGNIFICANT CHANGE UP (ref 0–0.5)
EOSINOPHIL NFR BLD AUTO: 2.3 %
EOSINOPHIL NFR BLD AUTO: 2.7 % — SIGNIFICANT CHANGE UP (ref 0–6)
GLUCOSE QUALITATIVE U: NEGATIVE MG/DL
GLUCOSE SERPL-MCNC: 99 MG/DL
HCT VFR BLD CALC: 44.6 % — SIGNIFICANT CHANGE UP (ref 39–50)
HCT VFR BLD CALC: 46.2 %
HGB BLD-MCNC: 14.4 G/DL — SIGNIFICANT CHANGE UP (ref 13–17)
HGB BLD-MCNC: 14.7 G/DL
IMM GRANULOCYTES NFR BLD AUTO: 0.3 %
IMM GRANULOCYTES NFR BLD AUTO: 0.3 % — SIGNIFICANT CHANGE UP (ref 0–0.9)
INR PPP: 1.05 RATIO
KETONES URINE: NEGATIVE MG/DL
LEUKOCYTE ESTERASE URINE: ABNORMAL
LYMPHOCYTES # BLD AUTO: 1.71 K/UL
LYMPHOCYTES # BLD AUTO: 1.83 K/UL — SIGNIFICANT CHANGE UP (ref 1–3.3)
LYMPHOCYTES # BLD AUTO: 27.8 % — SIGNIFICANT CHANGE UP (ref 13–44)
LYMPHOCYTES NFR BLD AUTO: 24.2 %
MAN DIFF?: NORMAL
MCHC RBC-ENTMCNC: 29.8 PG
MCHC RBC-ENTMCNC: 29.9 PG — SIGNIFICANT CHANGE UP (ref 27–34)
MCHC RBC-ENTMCNC: 31.8 G/DL
MCHC RBC-ENTMCNC: 32.3 G/DL — SIGNIFICANT CHANGE UP (ref 32–36)
MCV RBC AUTO: 92.5 FL — SIGNIFICANT CHANGE UP (ref 80–100)
MCV RBC AUTO: 93.5 FL
MONOCYTES # BLD AUTO: 0.37 K/UL
MONOCYTES # BLD AUTO: 0.42 K/UL — SIGNIFICANT CHANGE UP (ref 0–0.9)
MONOCYTES NFR BLD AUTO: 5.2 %
MONOCYTES NFR BLD AUTO: 6.4 % — SIGNIFICANT CHANGE UP (ref 2–14)
NEUTROPHILS # BLD AUTO: 4.09 K/UL — SIGNIFICANT CHANGE UP (ref 1.8–7.4)
NEUTROPHILS # BLD AUTO: 4.76 K/UL
NEUTROPHILS NFR BLD AUTO: 62.2 % — SIGNIFICANT CHANGE UP (ref 43–77)
NEUTROPHILS NFR BLD AUTO: 67.3 %
NITRITE URINE: NEGATIVE
PH URINE: 5.5
PLATELET # BLD AUTO: 225 K/UL
PLATELET # BLD AUTO: 225 K/UL — SIGNIFICANT CHANGE UP (ref 150–400)
POTASSIUM SERPL-SCNC: 4.4 MMOL/L
PROT SERPL-MCNC: 7.2 G/DL
PROTEIN URINE: NEGATIVE MG/DL
PT BLD: 12.5 SEC
RBC # BLD: 4.82 M/UL — SIGNIFICANT CHANGE UP (ref 4.2–5.8)
RBC # BLD: 4.94 M/UL
RBC # FLD: 13.2 % — SIGNIFICANT CHANGE UP (ref 10.3–14.5)
RBC # FLD: 13.5 %
SODIUM SERPL-SCNC: 143 MMOL/L
SPECIFIC GRAVITY URINE: 1.02
T3 SERPL-MCNC: 94 NG/DL
T3FREE SERPL-MCNC: 2.48 PG/ML
T4 FREE SERPL-MCNC: 0.9 NG/DL
T4 SERPL-MCNC: 6.6 UG/DL
TSH SERPL-ACNC: 4.68 UIU/ML
URATE SERPL-MCNC: 7.1 MG/DL
UROBILINOGEN URINE: 0.2 MG/DL
WBC # BLD: 6.58 K/UL — SIGNIFICANT CHANGE UP (ref 3.8–10.5)
WBC # FLD AUTO: 6.58 K/UL — SIGNIFICANT CHANGE UP (ref 3.8–10.5)
WBC # FLD AUTO: 7.07 K/UL

## 2025-01-10 PROCEDURE — 99214 OFFICE O/P EST MOD 30 MIN: CPT

## 2025-01-10 PROCEDURE — G2211 COMPLEX E/M VISIT ADD ON: CPT

## 2025-01-10 NOTE — ASU PATIENT PROFILE, ADULT - ABILITY TO HEAR (WITH HEARING AID OR HEARING APPLIANCE IF NORMALLY USED):
left hard of hearing/Mildly to Moderately Impaired: difficulty hearing in some environments or speaker may need to increase volume or speak distinctly

## 2025-01-10 NOTE — DISCUSSION/SUMMARY
[FreeTextEntry1] : In a summary Mr. Karan Lopez, Lc is an- elderly- male with abnormal EKG showing anterolateral ischemia and Pre- op, Echo done showed normal LV systolic function. Echo results explained. Nuclear stress test to rule out ischemia. Further plan based on stress test results. Hypothyroidism, on Levothyroxine. spent 50 minutes on encounter.

## 2025-01-10 NOTE — PHYSICAL EXAM
[Normal Conjunctiva] : normal conjunctiva [No Carotid Bruit] : no carotid bruit [Soft] : abdomen soft [Non Tender] : non-tender [Normal Bowel Sounds] : normal bowel sounds [No Edema] : no edema [No Cyanosis] : no cyanosis [No Rash] : no rash [Normal] : alert and oriented, normal memory [de-identified] : right chest wall tumor.

## 2025-01-10 NOTE — ADDENDUM
[FreeTextEntry1] : Mr. Lc Walsh had nuclear stress test on 1/9/2025 which is negative for ischemia. With normal cardiac work up, he will be low risk cardiac wise for chest wall tumor removal cardiac jolley. Discussed with Dr. Mao and daughter Ms. Toney.

## 2025-01-10 NOTE — HISTORY OF PRESENT ILLNESS
[FreeTextEntry1] : Lc Roberts is a 69- year- old Norwegian speaking male(translated by daughter Ms. Toney) with history of Hypothyroidism, Gout and abnormal EKG comes for pre- op cardiac evaluation for removal of right chest wall tumor Liposarcoma. Denies any chest pain or palpitations. No shortness of breath on exertion. Physically active.

## 2025-01-10 NOTE — HISTORY OF PRESENT ILLNESS
[FreeTextEntry1] : Removal right chest wall tumor [FreeTextEntry2] : 01/13/25 [FreeTextEntry4] : 68 years old male with past medical history of BPH, hyperlipidemia, and GERD came back to review his most recent test results.  Creatinine was 1.63, GFR 45, uric acid 7.1, TSH 4.68, and UA positive for trace leukocyte esterase. Echocardiogram and nuclear stress test showed negative for ischemic changes Results was reviewed with pt in details today. Other test results were wnl Pt has frequent joint pain, most co

## 2025-01-10 NOTE — HISTORY OF PRESENT ILLNESS
[FreeTextEntry1] : Lc Robrets is a 69- year- old Libyan speaking male(translated by daughter Ms. Toney) with history of Hypothyroidism, Gout and abnormal EKG comes for pre- op cardiac evaluation for removal of right chest wall tumor Liposarcoma. Denies any chest pain or palpitations. No shortness of breath on exertion. Physically active.

## 2025-01-10 NOTE — REVIEW OF SYSTEMS
[Under Stress] : under stress [Negative] : Respiratory [Blurry Vision] : no blurred vision [Dyspnea on exertion] : not dyspnea during exertion [Chest Discomfort] : no chest discomfort [Lower Ext Edema] : no extremity edema [Palpitations] : no palpitations [Orthopnea] : no orthopnea [PND] : no PND [Abdominal Pain] : no abdominal pain [Nausea] : no nausea [Vomiting] : no vomiting [Heartburn] : no heartburn [Joint Pain] : no joint pain [Rash] : no rash [Itching] : no itching [Dizziness] : no dizziness [Tremor] : no tremor was seen [Confusion] : no confusion was observed [Easy Bleeding] : no tendency for easy bleeding [Easy Bruising] : no tendency for easy bruising

## 2025-01-10 NOTE — PHYSICAL EXAM
[Normal Conjunctiva] : normal conjunctiva [No Carotid Bruit] : no carotid bruit [Soft] : abdomen soft [Non Tender] : non-tender [Normal Bowel Sounds] : normal bowel sounds [No Edema] : no edema [No Cyanosis] : no cyanosis [No Rash] : no rash [Normal] : alert and oriented, normal memory [de-identified] : right chest wall tumor.

## 2025-01-13 ENCOUNTER — APPOINTMENT (OUTPATIENT)
Dept: PLASTIC SURGERY | Facility: HOSPITAL | Age: 70
End: 2025-01-13

## 2025-01-13 ENCOUNTER — RESULT REVIEW (OUTPATIENT)
Age: 70
End: 2025-01-13

## 2025-01-13 ENCOUNTER — APPOINTMENT (OUTPATIENT)
Dept: THORACIC SURGERY | Facility: HOSPITAL | Age: 70
End: 2025-01-13

## 2025-01-13 ENCOUNTER — APPOINTMENT (OUTPATIENT)
Dept: SURGICAL ONCOLOGY | Facility: HOSPITAL | Age: 70
End: 2025-01-13
Payer: MEDICARE

## 2025-01-13 ENCOUNTER — INPATIENT (INPATIENT)
Facility: HOSPITAL | Age: 70
LOS: 0 days | Discharge: HOME | End: 2025-01-14
Attending: SURGERY | Admitting: PLASTIC SURGERY
Payer: MEDICARE

## 2025-01-13 VITALS
TEMPERATURE: 98 F | HEART RATE: 60 BPM | WEIGHT: 136.03 LBS | HEIGHT: 61.5 IN | OXYGEN SATURATION: 100 % | DIASTOLIC BLOOD PRESSURE: 68 MMHG | SYSTOLIC BLOOD PRESSURE: 120 MMHG | RESPIRATION RATE: 18 BRPM

## 2025-01-13 DIAGNOSIS — C49.3 MALIGNANT NEOPLASM OF CONNECTIVE AND SOFT TISSUE OF THORAX: ICD-10-CM

## 2025-01-13 LAB
ANION GAP SERPL CALC-SCNC: 12 MMOL/L — SIGNIFICANT CHANGE UP (ref 7–14)
BUN SERPL-MCNC: 22 MG/DL — SIGNIFICANT CHANGE UP (ref 7–23)
CALCIUM SERPL-MCNC: 9.4 MG/DL — SIGNIFICANT CHANGE UP (ref 8.4–10.5)
CHLORIDE SERPL-SCNC: 105 MMOL/L — SIGNIFICANT CHANGE UP (ref 98–107)
CO2 SERPL-SCNC: 23 MMOL/L — SIGNIFICANT CHANGE UP (ref 22–31)
CREAT SERPL-MCNC: 1.57 MG/DL — HIGH (ref 0.5–1.3)
EGFR: 47 ML/MIN/1.73M2 — LOW
GLUCOSE SERPL-MCNC: 151 MG/DL — HIGH (ref 70–99)
MAGNESIUM SERPL-MCNC: 2 MG/DL — SIGNIFICANT CHANGE UP (ref 1.6–2.6)
PHOSPHATE SERPL-MCNC: 4.1 MG/DL — SIGNIFICANT CHANGE UP (ref 2.5–4.5)
POTASSIUM SERPL-MCNC: 4.4 MMOL/L — SIGNIFICANT CHANGE UP (ref 3.5–5.3)
POTASSIUM SERPL-SCNC: 4.4 MMOL/L — SIGNIFICANT CHANGE UP (ref 3.5–5.3)
SODIUM SERPL-SCNC: 140 MMOL/L — SIGNIFICANT CHANGE UP (ref 135–145)

## 2025-01-13 PROCEDURE — 88342 IMHCHEM/IMCYTCHM 1ST ANTB: CPT | Mod: 26

## 2025-01-13 PROCEDURE — 21601 EXC CHEST WALL TUMOR W/RIBS: CPT | Mod: 82,RT

## 2025-01-13 PROCEDURE — 21601 EXC CHEST WALL TUMOR W/RIBS: CPT | Mod: RT

## 2025-01-13 PROCEDURE — 14301 TIS TRNFR ANY 30.1-60 SQ CM: CPT

## 2025-01-13 PROCEDURE — 14302 TIS TRNFR ADDL 30 SQ CM: CPT

## 2025-01-13 PROCEDURE — 15734 MUSCLE-SKIN GRAFT TRUNK: CPT

## 2025-01-13 PROCEDURE — 88309 TISSUE EXAM BY PATHOLOGIST: CPT | Mod: 26

## 2025-01-13 DEVICE — AGENT HEMOSTATIC HEMOBLAST 1.65G 10CM: Type: IMPLANTABLE DEVICE | Site: RIGHT | Status: FUNCTIONAL

## 2025-01-13 DEVICE — LIGATING CLIPS WECK HORIZON MEDIUM (BLUE) 24: Type: IMPLANTABLE DEVICE | Site: RIGHT | Status: FUNCTIONAL

## 2025-01-13 RX ORDER — COLCHICINE 0.6 MG/1
0 CAPSULE ORAL
Refills: 0 | DISCHARGE

## 2025-01-13 RX ORDER — SODIUM CHLORIDE 9 MG/ML
1000 INJECTION, SOLUTION INTRAVENOUS
Refills: 0 | Status: DISCONTINUED | OUTPATIENT
Start: 2025-01-13 | End: 2025-01-13

## 2025-01-13 RX ORDER — HEPARIN SODIUM 1000 [USP'U]/ML
5000 INJECTION, SOLUTION INTRAVENOUS; SUBCUTANEOUS EVERY 8 HOURS
Refills: 0 | Status: DISCONTINUED | OUTPATIENT
Start: 2025-01-13 | End: 2025-01-14

## 2025-01-13 RX ORDER — ALLOPURINOL 100 MG/1
100 TABLET ORAL DAILY
Refills: 0 | Status: DISCONTINUED | OUTPATIENT
Start: 2025-01-13 | End: 2025-01-14

## 2025-01-13 RX ORDER — OXYCODONE HCL 15 MG
5 TABLET ORAL EVERY 4 HOURS
Refills: 0 | Status: DISCONTINUED | OUTPATIENT
Start: 2025-01-13 | End: 2025-01-14

## 2025-01-13 RX ORDER — ACETAMINOPHEN 80 MG/.8ML
1000 SOLUTION/ DROPS ORAL EVERY 6 HOURS
Refills: 0 | Status: COMPLETED | OUTPATIENT
Start: 2025-01-13 | End: 2025-01-14

## 2025-01-13 RX ORDER — COLCHICINE 0.6 MG/1
0.6 CAPSULE ORAL DAILY
Refills: 0 | Status: DISCONTINUED | OUTPATIENT
Start: 2025-01-13 | End: 2025-01-14

## 2025-01-13 RX ORDER — CEFAZOLIN SODIUM 1 G
2000 VIAL (EA) INJECTION EVERY 8 HOURS
Refills: 0 | Status: COMPLETED | OUTPATIENT
Start: 2025-01-13 | End: 2025-01-14

## 2025-01-13 RX ORDER — ALLOPURINOL 100 MG/1
0 TABLET ORAL
Refills: 0 | DISCHARGE

## 2025-01-13 RX ORDER — OXYCODONE HCL 15 MG
5 TABLET ORAL ONCE
Refills: 0 | Status: DISCONTINUED | OUTPATIENT
Start: 2025-01-13 | End: 2025-01-13

## 2025-01-13 RX ORDER — COLCHICINE 0.6 MG/1
0.5 CAPSULE ORAL DAILY
Refills: 0 | Status: DISCONTINUED | OUTPATIENT
Start: 2025-01-13 | End: 2025-01-13

## 2025-01-13 RX ORDER — LEVOTHYROXINE SODIUM 175 UG/1
1 TABLET ORAL
Refills: 0 | DISCHARGE

## 2025-01-13 RX ORDER — LEVOTHYROXINE SODIUM 175 UG/1
25 TABLET ORAL DAILY
Refills: 0 | Status: DISCONTINUED | OUTPATIENT
Start: 2025-01-13 | End: 2025-01-14

## 2025-01-13 RX ORDER — OXYCODONE HCL 15 MG
10 TABLET ORAL EVERY 4 HOURS
Refills: 0 | Status: DISCONTINUED | OUTPATIENT
Start: 2025-01-13 | End: 2025-01-14

## 2025-01-13 RX ORDER — ONDANSETRON 4 MG/1
4 TABLET ORAL ONCE
Refills: 0 | Status: DISCONTINUED | OUTPATIENT
Start: 2025-01-13 | End: 2025-01-13

## 2025-01-13 RX ORDER — HYDROMORPHONE HCL 4 MG
0.5 TABLET ORAL
Refills: 0 | Status: DISCONTINUED | OUTPATIENT
Start: 2025-01-13 | End: 2025-01-13

## 2025-01-13 RX ADMIN — Medication 10 MILLIGRAM(S): at 16:28

## 2025-01-13 RX ADMIN — ACETAMINOPHEN 400 MILLIGRAM(S): 80 SOLUTION/ DROPS ORAL at 17:28

## 2025-01-13 RX ADMIN — Medication 10 MILLIGRAM(S): at 20:07

## 2025-01-13 RX ADMIN — Medication 10 MILLIGRAM(S): at 21:07

## 2025-01-13 RX ADMIN — Medication 0.5 MILLIGRAM(S): at 11:10

## 2025-01-13 RX ADMIN — Medication 100 MILLIGRAM(S): at 20:25

## 2025-01-13 RX ADMIN — HEPARIN SODIUM 5000 UNIT(S): 1000 INJECTION, SOLUTION INTRAVENOUS; SUBCUTANEOUS at 22:19

## 2025-01-13 RX ADMIN — Medication 10 MILLIGRAM(S): at 15:58

## 2025-01-13 RX ADMIN — Medication 0.5 MILLIGRAM(S): at 11:40

## 2025-01-13 RX ADMIN — ACETAMINOPHEN 1000 MILLIGRAM(S): 80 SOLUTION/ DROPS ORAL at 17:57

## 2025-01-13 NOTE — ASU PREOP CHECKLIST - COMMENTS
Pepcid with sips of water Interpolation Flap Text: A decision was made to reconstruct the defect utilizing an interpolation axial flap and a staged reconstruction.  A telfa template was made of the defect.  This telfa template was then used to outline the interpolation flap.  The donor area for the pedicle flap was then injected with anesthesia.  The flap was excised through the skin and subcutaneous tissue down to the layer of the underlying musculature.  The interpolation flap was carefully excised within this deep plane to maintain its blood supply.  The edges of the donor site were undermined.   The donor site was closed in a primary fashion.  The pedicle was then rotated into position and sutured.  Once the tube was sutured into place, adequate blood supply was confirmed with blanching and refill.  The pedicle was then wrapped with xeroform gauze and dressed appropriately with a telfa and gauze bandage to ensure continued blood supply and protect the attached pedicle.

## 2025-01-13 NOTE — CHART NOTE - NSCHARTNOTEFT_GEN_A_CORE
Post Operative Note  Patient: LUZ ELENA FAM (1955) Male   MRN: [id number]  Location: [facility]  Visit: 01-13-25 Inpatient  Date: 01-13-25 @ 14:40    Procedure: S/P Excision right chest wall liposarcoma, primary closure on 1/13/2025.     Subjective: Patient seen and examined post operatively. Endorses R sided chest wall pain that is moderatly controlled on current regimen. Denies nausea, vomiting, fever, chills, chest pain, SOB, cough.      Objective:  Vitals: T(F): 98.7 (01-13-25 @ 13:00), Max: 98.7 (01-13-25 @ 11:00)  HR: 76 (01-13-25 @ 13:00)  BP: 108/64 (01-13-25 @ 14:00) (108/64 - 123/77)  RR: 20 (01-13-25 @ 13:00)  SpO2: 93% (01-13-25 @ 14:00)  Vent Settings:     In:   01-13-25 @ 07:01  -  01-13-25 @ 14:40  --------------------------------------------------------  IN: 0 mL      IV Fluids: lactated ringers. 1000 milliLiter(s) (75 mL/Hr) IV Continuous <Continuous>      Out:   01-13-25 @ 07:01 - 01-13-25 @ 14:40  --------------------------------------------------------  OUT: 25 mL      EBL:     Voided Urine:   01-13-25 @ 07:01  -  01-13-25 @ 14:40  --------------------------------------------------------  OUT: 25 mL      Drains:   DANA:   01-13-25 @ 07:01  -  01-13-25 @ 14:40  --------------------------------------------------------  OUT: 25 mL       Physical Examination:  General: NAD, resting comfortably in bed  HEENT: Normocephalic atraumatic  Respiratory/ Chest: Nonlabored respirations, normal CW expansion. Dressing c/d/i. Ace wrap intact. No strikethrough. 1 DANA drain w/ SSO.    Cardio: regular rate and rhythm.  Vascular: extremities are warm and well perfused.     Imaging:  No post-op imaging studies    Assessment:  69yMale patient S/P Excision right chest wall liposarcoma, primary closure on 1/13/2025. Recovering appropriately.     Plan:  - Pain control   - Monitor drain output  - Care per primary team       Date/Time: 01-13-25 @ 14:40

## 2025-01-13 NOTE — BRIEF OPERATIVE NOTE - SECOND ASSIST PARTICIPATION DETAILS - (COMPONENTS OF THE PROCEDURE THAT ASSISTANT PARTICIPATED IN)
I acted within this role throughout the entirety of the procedure performed by the primary surgeon
Patient/Caregiver provided printed discharge information.

## 2025-01-13 NOTE — BRIEF OPERATIVE NOTE - NSICDXBRIEFPREOP_GEN_ALL_CORE_FT
PRE-OP DIAGNOSIS:  Liposarcoma 13-Jan-2025 08:52:59  Roman Preston  
PRE-OP DIAGNOSIS:  Liposarcoma 13-Jan-2025 08:52:59  Roman Preston

## 2025-01-13 NOTE — CHART NOTE - NSCHARTNOTEFT_GEN_A_CORE
Post Operative Note  Patient: LUZ ELENA FAM (1955) Male   MRN: [id number]  Location: [facility] 13  Visit: 01-13-25 Inpatient  Date: 01-13-25 @ 15:27    Procedure: S/P excision of right chest wall liposarcoma with primary closure and reconstruction.    Subjective: Patient seen and examined post operatively. Reports pain as mostly controlled with some R chest pain. Denies nausea, vomiting, fever, chills, chest pain, SOB, cough.      Objective:  Vitals: T(F): 98.7 (01-13-25 @ 13:00), Max: 98.7 (01-13-25 @ 11:00)  HR: 76 (01-13-25 @ 13:00)  BP: 108/64 (01-13-25 @ 14:00) (108/64 - 123/77)  RR: 20 (01-13-25 @ 13:00)  SpO2: 93% (01-13-25 @ 14:00)  Vent Settings:     In:   01-13-25 @ 07:01  -  01-13-25 @ 15:27  --------------------------------------------------------  IN: 0 mL      IV Fluids: lactated ringers. 1000 milliLiter(s) (75 mL/Hr) IV Continuous <Continuous>      Out:   01-13-25 @ 07:01 - 01-13-25 @ 15:27  --------------------------------------------------------  OUT: 25 mL      EBL:     Voided Urine:   01-13-25 @ 07:01 - 01-13-25 @ 15:27  --------------------------------------------------------  OUT: 25 mL      Leon Catheter: no     01-13-25 @ 07:01  -  01-13-25 @ 15:27  --------------------------------------------------------  OUT: 25 mL        Physical Examination:  General: NAD, resting comfortably in bed  HEENT: Normocephalic atraumatic  Respiratory: Nonlabored respirations, normal CW expansion.  Cardio: regular rate and rhythm.  Chest: surgical dressing with ACE wrap in place, c/d/i. b/l axilla soft. Mild TTP to R chest above and below dressing. DANA drain with ss output  Abdomen: soft, nontender, nondistended  Vascular: extremities are warm and well perfused.     Imaging:  No post-op imaging studies    Assessment:  69yMale patient S/P excision of right chest wall liposarcoma with primary closure and reconstruction 1/13/24.    Plan:  - IV abx: ancef x3 doses  - Pain control PRN  - Diet: Reg  - IVL  - Activity: OOB as tolerated  - DVT ppx: SCD's & SQH    E team surgery  [id number]    Date/Time: 01-13-25 @ 15:27 Post Operative Note  Patient: LUZ ELENA FAM (1955) Male   MRN: [id number]  Location: [facility] 13  Visit: 01-13-25 Inpatient  Date: 01-13-25 @ 15:27    Procedure: S/P excision of right chest wall liposarcoma with primary closure and reconstruction.    Subjective: Patient seen and examined post operatively. Reports pain as mostly controlled with some R chest pain. Denies nausea, vomiting, fever, chills, chest pain, SOB, cough.      Objective:  Vitals: T(F): 98.7 (01-13-25 @ 13:00), Max: 98.7 (01-13-25 @ 11:00)  HR: 76 (01-13-25 @ 13:00)  BP: 108/64 (01-13-25 @ 14:00) (108/64 - 123/77)  RR: 20 (01-13-25 @ 13:00)  SpO2: 93% (01-13-25 @ 14:00)  Vent Settings:     In:   01-13-25 @ 07:01  -  01-13-25 @ 15:27  --------------------------------------------------------  IN: 0 mL      IV Fluids: lactated ringers. 1000 milliLiter(s) (75 mL/Hr) IV Continuous <Continuous>      Out:   01-13-25 @ 07:01 - 01-13-25 @ 15:27  --------------------------------------------------------  OUT: 25 mL      EBL:     Voided Urine:   01-13-25 @ 07:01 - 01-13-25 @ 15:27  --------------------------------------------------------  OUT: 25 mL      Leon Catheter: no     01-13-25 @ 07:01  -  01-13-25 @ 15:27  --------------------------------------------------------  OUT: 25 mL        Physical Examination:  General: NAD, resting comfortably in bed  HEENT: Normocephalic atraumatic  Respiratory: Nonlabored respirations, normal CW expansion.  Cardio: regular rate and rhythm.  Chest: surgical dressing with ACE wrap in place, c/d/i. b/l axilla soft. Mild TTP to R chest above and below dressing. DANA drain with ss output  Abdomen: soft, nontender, nondistended  Vascular: extremities are warm and well perfused.     Imaging:  No post-op imaging studies    Assessment:  69yMale patient S/P excision of right chest wall liposarcoma with primary closure and reconstruction 1/13/25.    Plan:  - IV abx: ancef x3 doses  - Pain control PRN  - Diet: Reg  - IVL  - Activity: OOB as tolerated  - DVT ppx: SCD's & SQH    E team surgery  [id number]    Date/Time: 01-13-25 @ 15:27

## 2025-01-13 NOTE — BRIEF OPERATIVE NOTE - OPERATION/FINDINGS
Reconstruction of right chest wall, local flap
right radical mastectomy for right breast mass invading thruogh pectoralis major

## 2025-01-13 NOTE — BRIEF OPERATIVE NOTE - NSICDXBRIEFPROCEDURE_GEN_ALL_CORE_FT
PROCEDURES:  Simple mastectomy of right breast in male 13-Jan-2025 10:06:39  Sam Betancourt  
PROCEDURES:  Chest wall reconstruction, major 13-Jan-2025 08:52:34  Roman Preston

## 2025-01-13 NOTE — BRIEF OPERATIVE NOTE - NSICDXBRIEFPOSTOP_GEN_ALL_CORE_FT
POST-OP DIAGNOSIS:  Liposarcoma 13-Jan-2025 08:53:05  Roman Preston  
POST-OP DIAGNOSIS:  Liposarcoma 13-Jan-2025 08:53:05  Roman Preston  
Patient/Caregiver provided printed discharge information.

## 2025-01-14 ENCOUNTER — TRANSCRIPTION ENCOUNTER (OUTPATIENT)
Age: 70
End: 2025-01-14

## 2025-01-14 VITALS
RESPIRATION RATE: 18 BRPM | OXYGEN SATURATION: 98 % | SYSTOLIC BLOOD PRESSURE: 124 MMHG | TEMPERATURE: 99 F | HEART RATE: 74 BPM | DIASTOLIC BLOOD PRESSURE: 60 MMHG

## 2025-01-14 LAB
ANION GAP SERPL CALC-SCNC: 13 MMOL/L — SIGNIFICANT CHANGE UP (ref 7–14)
BASOPHILS # BLD AUTO: 0.05 K/UL — SIGNIFICANT CHANGE UP (ref 0–0.2)
BASOPHILS NFR BLD AUTO: 0.5 % — SIGNIFICANT CHANGE UP (ref 0–2)
BUN SERPL-MCNC: 22 MG/DL — SIGNIFICANT CHANGE UP (ref 7–23)
CALCIUM SERPL-MCNC: 9 MG/DL — SIGNIFICANT CHANGE UP (ref 8.4–10.5)
CHLORIDE SERPL-SCNC: 106 MMOL/L — SIGNIFICANT CHANGE UP (ref 98–107)
CO2 SERPL-SCNC: 21 MMOL/L — LOW (ref 22–31)
CREAT SERPL-MCNC: 1.35 MG/DL — HIGH (ref 0.5–1.3)
EGFR: 56 ML/MIN/1.73M2 — LOW
EOSINOPHIL # BLD AUTO: 0.01 K/UL — SIGNIFICANT CHANGE UP (ref 0–0.5)
EOSINOPHIL NFR BLD AUTO: 0.1 % — SIGNIFICANT CHANGE UP (ref 0–6)
GLUCOSE SERPL-MCNC: 110 MG/DL — HIGH (ref 70–99)
HCT VFR BLD CALC: 39.6 % — SIGNIFICANT CHANGE UP (ref 39–50)
HGB BLD-MCNC: 13.3 G/DL — SIGNIFICANT CHANGE UP (ref 13–17)
IANC: 7.89 K/UL — HIGH (ref 1.8–7.4)
IMM GRANULOCYTES NFR BLD AUTO: 0.4 % — SIGNIFICANT CHANGE UP (ref 0–0.9)
LYMPHOCYTES # BLD AUTO: 1.63 K/UL — SIGNIFICANT CHANGE UP (ref 1–3.3)
LYMPHOCYTES # BLD AUTO: 15.5 % — SIGNIFICANT CHANGE UP (ref 13–44)
MAGNESIUM SERPL-MCNC: 2.2 MG/DL — SIGNIFICANT CHANGE UP (ref 1.6–2.6)
MCHC RBC-ENTMCNC: 30.2 PG — SIGNIFICANT CHANGE UP (ref 27–34)
MCHC RBC-ENTMCNC: 33.6 G/DL — SIGNIFICANT CHANGE UP (ref 32–36)
MCV RBC AUTO: 89.8 FL — SIGNIFICANT CHANGE UP (ref 80–100)
MONOCYTES # BLD AUTO: 0.89 K/UL — SIGNIFICANT CHANGE UP (ref 0–0.9)
MONOCYTES NFR BLD AUTO: 8.5 % — SIGNIFICANT CHANGE UP (ref 2–14)
NEUTROPHILS # BLD AUTO: 7.89 K/UL — HIGH (ref 1.8–7.4)
NEUTROPHILS NFR BLD AUTO: 75 % — SIGNIFICANT CHANGE UP (ref 43–77)
NRBC # BLD: 0 /100 WBCS — SIGNIFICANT CHANGE UP (ref 0–0)
NRBC # FLD: 0 K/UL — SIGNIFICANT CHANGE UP (ref 0–0)
PHOSPHATE SERPL-MCNC: 3.2 MG/DL — SIGNIFICANT CHANGE UP (ref 2.5–4.5)
PLATELET # BLD AUTO: 208 K/UL — SIGNIFICANT CHANGE UP (ref 150–400)
POTASSIUM SERPL-MCNC: 3.7 MMOL/L — SIGNIFICANT CHANGE UP (ref 3.5–5.3)
POTASSIUM SERPL-SCNC: 3.7 MMOL/L — SIGNIFICANT CHANGE UP (ref 3.5–5.3)
RBC # BLD: 4.41 M/UL — SIGNIFICANT CHANGE UP (ref 4.2–5.8)
RBC # FLD: 13.2 % — SIGNIFICANT CHANGE UP (ref 10.3–14.5)
SODIUM SERPL-SCNC: 140 MMOL/L — SIGNIFICANT CHANGE UP (ref 135–145)
WBC # BLD: 10.51 K/UL — HIGH (ref 3.8–10.5)
WBC # FLD AUTO: 10.51 K/UL — HIGH (ref 3.8–10.5)

## 2025-01-14 RX ORDER — POTASSIUM CHLORIDE 600 MG/1
40 TABLET, FILM COATED, EXTENDED RELEASE ORAL ONCE
Refills: 0 | Status: COMPLETED | OUTPATIENT
Start: 2025-01-14 | End: 2025-01-14

## 2025-01-14 RX ORDER — IBUPROFEN 200 MG
2 TABLET ORAL
Refills: 0 | DISCHARGE

## 2025-01-14 RX ORDER — OXYCODONE HCL 15 MG
1 TABLET ORAL
Qty: 12 | Refills: 0
Start: 2025-01-14 | End: 2025-01-16

## 2025-01-14 RX ADMIN — HEPARIN SODIUM 5000 UNIT(S): 1000 INJECTION, SOLUTION INTRAVENOUS; SUBCUTANEOUS at 06:38

## 2025-01-14 RX ADMIN — Medication 5 MILLIGRAM(S): at 09:34

## 2025-01-14 RX ADMIN — ALLOPURINOL 100 MILLIGRAM(S): 100 TABLET ORAL at 11:34

## 2025-01-14 RX ADMIN — ACETAMINOPHEN 1000 MILLIGRAM(S): 80 SOLUTION/ DROPS ORAL at 00:35

## 2025-01-14 RX ADMIN — Medication 5 MILLIGRAM(S): at 10:04

## 2025-01-14 RX ADMIN — ACETAMINOPHEN 1000 MILLIGRAM(S): 80 SOLUTION/ DROPS ORAL at 06:38

## 2025-01-14 RX ADMIN — ACETAMINOPHEN 400 MILLIGRAM(S): 80 SOLUTION/ DROPS ORAL at 06:38

## 2025-01-14 RX ADMIN — Medication 100 MILLIGRAM(S): at 11:33

## 2025-01-14 RX ADMIN — COLCHICINE 0.6 MILLIGRAM(S): 0.6 CAPSULE ORAL at 11:34

## 2025-01-14 RX ADMIN — ACETAMINOPHEN 400 MILLIGRAM(S): 80 SOLUTION/ DROPS ORAL at 00:05

## 2025-01-14 RX ADMIN — POTASSIUM CHLORIDE 40 MILLIEQUIVALENT(S): 600 TABLET, FILM COATED, EXTENDED RELEASE ORAL at 09:32

## 2025-01-14 RX ADMIN — LEVOTHYROXINE SODIUM 25 MICROGRAM(S): 175 TABLET ORAL at 06:37

## 2025-01-14 RX ADMIN — Medication 100 MILLIGRAM(S): at 04:36

## 2025-01-14 NOTE — DISCHARGE NOTE PROVIDER - NSDCCPTREATMENT_GEN_ALL_CORE_FT
PRINCIPAL PROCEDURE  Procedure: Simple mastectomy of right breast in male  Findings and Treatment:       SECONDARY PROCEDURE  Procedure: Chest wall reconstruction, major  Findings and Treatment:      PRINCIPAL PROCEDURE  Procedure: Simple mastectomy of right breast in male  Findings and Treatment: Follow Instructions Provided by your Surgical Team  NOTIFY YOUR SURGEON IF YOU HAVE: any bleeding that does not stop, any pus draining from your wound(s), any fever (over 100.4 F) persistent nausea/vomiting, or if your pain is not controlled on your discharge pain medications, unable to urinate.  ACTIVITY: Please refrain from increased physical activity for a period of 2 weeks. Please do not lift anything heavier than a gallon of milk or about 5 pounds. Upon follow up you will be given further instructions on how much physical activity you may do.   DRAINS: You will be discharged with DANA drains. You will need to empty them and record outputs accurately. This will be taught to you by the nursing staff. Please do not remove the DANA drains. They will be removed in the office. Please bring to the office accurate records of output.   Please take 1000mg Tylenol every 6 hours as needed. Please do not exceed 4000mg Tylenol in any 24 hour period. You are being prescribed 5mg oxycodone tablets. Please take 1 every 6 hours as needed for severe post surgical breakthrough pain not controlled by other medications. This prescription has been sent to your specified pharmacy.    Please keep chest dry, drain sites dry. Please avoid raising arms or reaching outward for things.   Please follow up with Dr. Preston within x1 week after discharge from the hospital. You may call [phone] to schedule an appointment.    At the time of discharge, the patient was hemodynamically stable, was tolerating PO diet, was voiding urine, was ambulating, and was comfortable with adequate pain control.      SECONDARY PROCEDURE  Procedure: Chest wall reconstruction, major  Findings and Treatment:      PRINCIPAL PROCEDURE  Procedure: Simple mastectomy of right breast in male  Findings and Treatment: Follow Instructions Provided by your Surgical Team  NOTIFY YOUR SURGEON IF YOU HAVE: any bleeding that does not stop, any pus draining from your wound(s), any fever (over 100.4 F) persistent nausea/vomiting, or if your pain is not controlled on your discharge pain medications, unable to urinate.  ACTIVITY: Please refrain from increased physical activity for a period of 2 weeks. Please do not lift anything heavier than a gallon of milk or about 5 pounds. Upon follow up you will be given further instructions on how much physical activity you may do.   DRAINS: You will be discharged with ADNA drains. You will need to empty them and record outputs accurately. This will be taught to you by the nursing staff. Please do not remove the DANA drains. They will be removed in the office. Please bring to the office accurate records of output.   Please take 1000mg Tylenol every 6 hours as needed. Please do not exceed 4000mg Tylenol in any 24 hour period. You are being prescribed 5mg oxycodone tablets. Please take 1 every 6 hours as needed for severe post surgical breakthrough pain not controlled by other medications. This prescription has been sent to your specified pharmacy.    Please keep chest dry, drain sites dry. Please avoid raising arms or reaching outward for things.   Please follow up with Dr. Preston within x1 week after discharge from the hospital. You may call [phone] to schedule an appointment.    At the time of discharge, the patient was hemodynamically stable, was tolerating PO diet, was voiding urine, was ambulating, and was comfortable with adequate pain control.  You may shower from the waist down. Please keep surgical area dry. Please keep compression dressing on.      SECONDARY PROCEDURE  Procedure: Chest wall reconstruction, major  Findings and Treatment:

## 2025-01-14 NOTE — DISCHARGE NOTE NURSING/CASE MANAGEMENT/SOCIAL WORK - FINANCIAL ASSISTANCE
[facility] provides services at a reduced cost to those who are determined to be eligible through [facility]’s financial assistance program. Information regarding [facility]’s financial assistance program can be found by going to [email or web address] or by calling [phone].

## 2025-01-14 NOTE — DISCHARGE NOTE PROVIDER - CARE PROVIDER_API CALL
Chester Sidhu  Surgery  [address] [place] [address]  Phone: [phone]  Fax: [phone]  Follow Up Time: 2 weeks    Camilo Preston)  Plastic Surgery  [address] [place] [address] [address]  [place] [address]  Phone: [phone]  Fax: [phone]  Follow Up Time: 1 week

## 2025-01-14 NOTE — DISCHARGE NOTE PROVIDER - PROVIDER TOKENS
PROVIDER:[TOKEN:[[id number]],FOLLOWUP:[2 weeks]],PROVIDER:[TOKEN:[[id number]],FOLLOWUP:[1 week]]

## 2025-01-14 NOTE — DISCHARGE NOTE PROVIDER - NSDCFUADDAPPT_GEN_ALL_CORE_FT
Please call to schedule a follow up appointment with Dr. Preston for next Tuesday 1/21/24.     Please schedule an appointment with your primary care provider within 1-2 weeks of discharge to go over your recent hospitalization. Please bring your discharge paperwork with you.

## 2025-01-14 NOTE — DISCHARGE NOTE NURSING/CASE MANAGEMENT/SOCIAL WORK - NSDCPEFALRISK_GEN_ALL_CORE
For information on Fall & Injury Prevention, visit: [email or web address] OR  [email or web address] OR  https://www.cdc.gov/steadi/patient.html

## 2025-01-14 NOTE — DISCHARGE NOTE PROVIDER - NSDCFUSCHEDAPPT_GEN_ALL_CORE_FT
[facility] Physician [facility] [address]  Scheduled Appointment: 02/14/2025

## 2025-01-14 NOTE — DISCHARGE NOTE NURSING/CASE MANAGEMENT/SOCIAL WORK - PATIENT PORTAL LINK FT
You can access the FollowMyHealth Patient Portal offered by [facility] by registering at the following website: [email or web address]. By joining [employer]’s FollowMyHealth portal, you will also be able to view your health information using other applications (apps) compatible with our system.

## 2025-01-14 NOTE — DISCHARGE NOTE PROVIDER - HOSPITAL COURSE
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 Patient went to the OR 1/13 and is s/p excision of right chest wall liposarcoma with primary closure and reconstruction of chest wall with local flap. Patient was brought to PACU post-operatively and then transferred to the floor. Post-op labs were stable and patient is recovering appropriately. Post op patient's diet was slowly advanced as tolerated.  At this time, pt is tolerating a regular diet, ambulating and voiding.  Pt has been deemed stable for discharge at this time.

## 2025-01-14 NOTE — PROGRESS NOTE ADULT - ASSESSMENT
69yMale patient with hx of BPH, GERD, gout, hypothyroidism now S/P excision of right chest wall liposarcoma with primary closure and reconstruction 1/13/24.    Plan:  - IV abx: ancef x3 doses  - Pain control PRN  - Diet: Reg  - F/u plastics team for dispo recs  - Resumed home levothyroxine, allopurinol, and colchicine  - Monitor DANA drain output  - Activity: OOB as tolerated  - DVT ppx: SCD's & SQH    E team surgery  [id number]   69yMale patient with hx of BPH, GERD, gout, hypothyroidism now S/P excision of right chest wall liposarcoma with primary closure and reconstruction 1/13/25.    Plan:  - IV abx: ancef x3 doses  - Pain control PRN  - Diet: Reg  - F/u plastics team for dispo recs  - Resumed home levothyroxine, allopurinol, and colchicine  - Monitor DANA drain output  - Activity: OOB as tolerated  - DVT ppx: SCD's & SQH    E team surgery  [id number]

## 2025-01-14 NOTE — DISCHARGE NOTE PROVIDER - CARE PROVIDERS DIRECT ADDRESSES
[email or web address]

## 2025-01-14 NOTE — DISCHARGE NOTE PROVIDER - NSDCFUADDINST_GEN_ALL_CORE_FT
WOUND CARE:  Please keep incisions clean and dry. Please do not Scrub or rub incisions. Do not use lotion or powder on incisions. Keep compression wrapping on. Keep ACE wrap dressing dry and intact until follow up appointment with Dr. Preston.   Woody Corey drain (DANA Drain): You are being discharged with a DANA drain. It is important that you measure and record the fluid that is in it (output) on the output record sheet daily. Please bring the output record sheet to your follow-up appointment. Keep the drain secured to your clothing with a safety pin at all times to avoid accidental displacement. Monitor the insertion site for redness, pain, swelling, or purulent drainage.  You may shower with the drain. Wash around the drain with soap and water, pat dry, and reapply dressing. If you noticed a sudden change in the color or amount of fluid in the drain, please contact your surgeon’s office.  Your drain will be removed at an outpatient follow up appointment.   BATHING: You may shower from the waist down. Do not let your ACE wrap dressing get wet while showering/bathing. Keep ACE wrap dressing dry and intact until follow up appointment with Dr. Preston.  ACTIVITY: No heavy lifting or straining. Otherwise, you may return to your usual level of physical activity. If you are taking narcotic pain medication DO NOT drive a car, operate machinery or make important decisions.  DIET: Return to your usual diet.  NOTIFY YOUR SURGEON IF YOU HAVE: any bleeding that does not stop, any pus draining from your wound(s), any fever (over 100.4 F) persistent nausea/vomiting, or if your pain is not controlled on your discharge pain medications, unable to urinate.  Please follow up with your primary care physician in one week regarding your hospitalization, bring copies of your discharge paperwork.  Please follow up with your surgeon, Dr. Sidhu and Dr. Preston

## 2025-01-14 NOTE — DISCHARGE NOTE PROVIDER - NSDCMRMEDTOKEN_GEN_ALL_CORE_FT
allopurinol 100 mg oral tablet: orally once a day  colchicine 0.5 mg oral tablet: orally once a day  ibuprofen 200 mg oral tablet: 2 tab(s) orally as needed for pain  levothyroxine 25 mcg (0.025 mg) oral tablet: 1 tab(s) orally once a day   allopurinol 100 mg oral tablet: orally once a day  colchicine 0.5 mg oral tablet: orally once a day  levothyroxine 25 mcg (0.025 mg) oral tablet: 1 tab(s) orally once a day  oxyCODONE 5 mg oral tablet: 1 tab(s) orally every 6 hours as needed for  severe pain MDD: 4 tabs

## 2025-01-14 NOTE — PROGRESS NOTE ADULT - SUBJECTIVE AND OBJECTIVE BOX
TEAM [ E ] Surgery Daily Progress Note  =====================================================    SUBJECTIVE: Patient seen and examined at bedside on AM rounds. Patient reports that they're feeling well. Tolerating diet, denies nausea, vomiting. OOB/Amublating as tolerated. Denies fever, chills.    ALLERGIES:  No Known Allergies      --------------------------------------------------------------------------------------    MEDICATIONS:    Neurologic Medications  oxyCODONE    IR 5 milliGRAM(s) Oral every 4 hours PRN Moderate Pain (4 - 6)  oxyCODONE    IR 10 milliGRAM(s) Oral every 4 hours PRN Severe Pain (7 - 10)    Respiratory Medications    Cardiovascular Medications    Gastrointestinal Medications    Genitourinary Medications    Hematologic/Oncologic Medications  heparin   Injectable 5000 Unit(s) SubCutaneous every 8 hours    Antimicrobial/Immunologic Medications  ceFAZolin   IVPB 2000 milliGRAM(s) IV Intermittent every 8 hours    Endocrine/Metabolic Medications  allopurinol 100 milliGRAM(s) Oral daily  colchicine 0.6 milliGRAM(s) Oral daily  levothyroxine 25 MICROGram(s) Oral daily    Topical/Other Medications    --------------------------------------------------------------------------------------    VITAL SIGNS:  T(C): 36.8 (01-14-25 @ 04:43), Max: 37.1 (01-13-25 @ 11:00)  HR: 62 (01-14-25 @ 04:43) (58 - 76)  BP: 107/59 (01-14-25 @ 04:43) (101/66 - 123/77)  RR: 18 (01-14-25 @ 04:43) (11 - 20)  SpO2: 97% (01-14-25 @ 04:43) (93% - 100%)  --------------------------------------------------------------------------------------    EXAM  General: NAD, resting comfortably in bed  HEENT: Normocephalic atraumatic  Respiratory: Nonlabored respirations, normal CW expansion.  Cardio: regular rate and rhythm.  Chest: surgical dressing with ACE wrap in place, c/d/i. b/l axilla soft. Mild TTP to R chest above and below dressing. DANA drain with ss output  Abdomen: soft, nontender, nondistended    --------------------------------------------------------------------------------------    LABS  01-13    140  |  105  |  22  ----------------------------<  151[H]  4.4   |  23  |  1.57[H]    Ca    9.4      13 Jan 2025 17:52  Phos  4.1     01-13  Mg     2.00     01-13      --------------------------------------------------------------------------------------    INS AND OUTS:    01-13-25 @ 07:01  -  01-14-25 @ 07:00  --------------------------------------------------------  IN: 780 mL / OUT: 40 mL / NET: 740 mL      --------------------------------------------------------------------------------------

## 2025-01-15 ENCOUNTER — APPOINTMENT (OUTPATIENT)
Dept: PLASTIC SURGERY | Facility: CLINIC | Age: 70
End: 2025-01-15
Payer: MEDICARE

## 2025-01-15 VITALS — WEIGHT: 135 LBS | BODY MASS INDEX: 24.22 KG/M2 | HEIGHT: 62.5 IN

## 2025-01-15 DIAGNOSIS — Z48.89 ENCOUNTER FOR OTHER SPECIFIED SURGICAL AFTERCARE: ICD-10-CM

## 2025-01-15 PROBLEM — E03.9 HYPOTHYROIDISM, UNSPECIFIED: Chronic | Status: ACTIVE | Noted: 2025-01-09

## 2025-01-15 PROBLEM — K21.9 GASTRO-ESOPHAGEAL REFLUX DISEASE WITHOUT ESOPHAGITIS: Chronic | Status: ACTIVE | Noted: 2025-01-09

## 2025-01-15 PROBLEM — Z98.890 POSTOPERATIVE STATE: Status: ACTIVE | Noted: 2025-01-15

## 2025-01-15 PROBLEM — Z87.11 PERSONAL HISTORY OF PEPTIC ULCER DISEASE: Chronic | Status: ACTIVE | Noted: 2025-01-09

## 2025-01-15 PROBLEM — M10.9 GOUT, UNSPECIFIED: Chronic | Status: ACTIVE | Noted: 2025-01-09

## 2025-01-15 PROCEDURE — 99212 OFFICE O/P EST SF 10 MIN: CPT

## 2025-01-15 NOTE — PHYSICAL EXAM
[TextEntry] : Right chest wall: ACE wrap around chest removed, prineo intact, incision c/d/i, no gaps or open areas, no fluctuance, SUSAN drain with good output, no evidence of blockage new abd pad + ACE wrap applied for compression

## 2025-01-15 NOTE — ASSESSMENT
[FreeTextEntry1] : REFUGIO ALCANTARA is a 69 year old M who is s/p reconstruction of right chest wall liposarcoma, possible local flap. DOS 1/13/25.  - Continue compressive dressing - Continue to monitor SUSAN drain output BID - Activity restrictions reviewed - F/u 1/21/25 as planned

## 2025-01-15 NOTE — HISTORY OF PRESENT ILLNESS
[FreeTextEntry1] : REFUGIO ALCANTARA is a 69 year old M who is s/p reconstruction of right chest wall liposarcoma, possible local flap. DOS 1/13/25.  Patient is accompanied by his daughter. Patient contacted the office today due to c/f increased SUSAN drain output, photo reviewed via secure email. Patient presents today for further evaluation.  Patient was discharged on POD#1, 1/14/25. Today is POD#2, first post-op visit. SUSAN drain with serosanguineous output, drain log reviewed, plan to keep SUSAN drain in place.

## 2025-01-16 PROBLEM — C49.9 MALIGNANT NEOPLASM OF CONNECTIVE AND SOFT TISSUE, UNSPECIFIED: Chronic | Status: ACTIVE | Noted: 2025-01-09

## 2025-01-16 LAB — SURGICAL PATHOLOGY STUDY: SIGNIFICANT CHANGE UP

## 2025-01-21 ENCOUNTER — APPOINTMENT (OUTPATIENT)
Dept: PLASTIC SURGERY | Facility: CLINIC | Age: 70
End: 2025-01-21
Payer: MEDICARE

## 2025-01-21 VITALS
SYSTOLIC BLOOD PRESSURE: 118 MMHG | TEMPERATURE: 97.8 F | OXYGEN SATURATION: 98 % | HEART RATE: 61 BPM | BODY MASS INDEX: 23.5 KG/M2 | WEIGHT: 131 LBS | DIASTOLIC BLOOD PRESSURE: 75 MMHG | HEIGHT: 62.5 IN

## 2025-01-21 PROCEDURE — 99024 POSTOP FOLLOW-UP VISIT: CPT

## 2025-01-21 NOTE — HISTORY OF PRESENT ILLNESS
[FreeTextEntry1] : REFUGIO ALCANTARA is a 69 year old M who is s/p reconstruction of right chest wall liposarcoma, possible local flap. DOS 1/13/25.  Patient is accompanied by his daughter. Patient is POD#8. This is his second post-op visit. Drain log reviewed, still with significant output, >50cc's/day, plan to keep at this time. Patient offers no acute complaints, denies cp, sob, subjective fever, chills, and sweats.

## 2025-01-21 NOTE — PHYSICAL EXAM
[TextEntry] : Right chest wall: prineo intact, incision c/d/i, no fluctuance, no gaps or open areas SUSAN drain dressing changed, good output new abd pad + ACE wrap applied for compression +resolving ecchymosis to the right axilla, +post-op edema

## 2025-01-21 NOTE — ASSESSMENT
[FreeTextEntry1] : REFUGIO ALCANTARA is a 69 year old M who is s/p reconstruction of right chest wall liposarcoma, possible local flap. DOS 1/13/25.  - Continue compressive dressing - Continue to monitor SUSAN drain output - F/u 1 week

## 2025-01-27 ENCOUNTER — APPOINTMENT (OUTPATIENT)
Dept: SURGICAL ONCOLOGY | Facility: HOSPITAL | Age: 70
End: 2025-01-27

## 2025-01-28 ENCOUNTER — APPOINTMENT (OUTPATIENT)
Dept: SURGICAL ONCOLOGY | Facility: CLINIC | Age: 70
End: 2025-01-28
Payer: MEDICARE

## 2025-01-28 ENCOUNTER — NON-APPOINTMENT (OUTPATIENT)
Age: 70
End: 2025-01-28

## 2025-01-28 ENCOUNTER — APPOINTMENT (OUTPATIENT)
Dept: PLASTIC SURGERY | Facility: CLINIC | Age: 70
End: 2025-01-28
Payer: MEDICARE

## 2025-01-28 VITALS
HEART RATE: 63 BPM | BODY MASS INDEX: 23.5 KG/M2 | TEMPERATURE: 98.1 F | WEIGHT: 131 LBS | HEIGHT: 62.5 IN | SYSTOLIC BLOOD PRESSURE: 104 MMHG | DIASTOLIC BLOOD PRESSURE: 65 MMHG | OXYGEN SATURATION: 99 %

## 2025-01-28 VITALS
HEART RATE: 59 BPM | HEIGHT: 62.5 IN | RESPIRATION RATE: 16 BRPM | BODY MASS INDEX: 23.33 KG/M2 | OXYGEN SATURATION: 98 % | SYSTOLIC BLOOD PRESSURE: 109 MMHG | DIASTOLIC BLOOD PRESSURE: 68 MMHG | WEIGHT: 130 LBS

## 2025-01-28 DIAGNOSIS — R22.2 LOCALIZED SWELLING, MASS AND LUMP, TRUNK: ICD-10-CM

## 2025-01-28 PROCEDURE — 99024 POSTOP FOLLOW-UP VISIT: CPT

## 2025-01-28 NOTE — ASSESSMENT
[FreeTextEntry1] : REFUGIO ALCANTARA is a 69 year old M who is s/p reconstruction of right chest wall liposarcoma, possible local flap. DOS 1/13/25.  Patient is healing well, they are interested in therapy once SUSAN drain is removed.   - Continue compressive dressing - Continue to monitor SUSAN drain output - F/u 1 week, plan to remove prineo and hopefully SUSAN drain

## 2025-01-28 NOTE — HISTORY OF PRESENT ILLNESS
[FreeTextEntry1] : REFUGIO ALCANTARA is a 69 year old M who is s/p reconstruction of right chest wall liposarcoma, possible local flap. DOS 1/13/25.  Patient is accompanied by his daughter and wife. Patient left drain log at home however reports >50 cc'sday x 2 days, will keep SUSAN drain in place at this time. He endorses RUE weakness, otherwise denies cp, sob, subjective fever, chills, and sweats.

## 2025-01-28 NOTE — PHYSICAL EXAM
[TextEntry] : Right chest wall: prineo intact, incision c/d/i, no fluctuance, no gaps or open areas SUSAN drain dressing changed, good output new abd pad + ACE wrap applied for compression resolved ecchymosis to the right axilla, +resolving post-op edema

## 2025-01-29 NOTE — HISTORY OF PRESENT ILLNESS
[de-identified] : Mr. Lc Garcia is a 69 year old male, referred by Dr. Jie Mao, regarding right chest wall liposarcoma. Here for a post op visit Pt is only [place]ian speaking, prefers daughter to translate  Pt has a large palpable mass for 1.5 years. He noticed discomfort and small lump after doing pushups while in [place]. The [facility] doctor told him he most likely pulled a muscle and as the mass grew, he did not seek medical care. He continues to complain of minimal pain. He has a family history of breast cancer in her sister who lives in [facility]. He is otherwise healthy.   DM/US on 9/20/2024 shows a large mass identified in the right breast. 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  CT chest 12/10/2024:  An approximate 16 x 7.3 x 17.3 cm heterogeneous fat-containing mass located deep to the right pectoralis major muscle compatible with liposarcoma. Multiple adjacent subcentimeter axillary lymph nodes are noted, similar to contralateral left axilla lymph nodes  s/p right breast resection on 1/13/2025: Atypical lipomatous tumor, 15.5 x 13.5 x 7.3 cm. The tumor focally involves skeletal muscle and is submuscular per imaging.

## 2025-01-29 NOTE — PHYSICAL EXAM
[FreeTextEntry1] : SC present for exam  [de-identified] : right chest incision healing well, SUSAN still in place

## 2025-01-29 NOTE — PHYSICAL EXAM
[FreeTextEntry1] : SC present for exam  [de-identified] : right chest incision healing well, SUSAN still in place

## 2025-01-29 NOTE — HISTORY OF PRESENT ILLNESS
[de-identified] : Mr. Lc Garcia is a 69 year old male, referred by Dr. Jie Mao, regarding right chest wall liposarcoma. Here for a post op visit Pt is only [place] speaking, prefers daughter to translate  Pt has a large palpable mass for 1.5 years. He noticed discomfort and small lump after doing pushups while in [place]. The [facility] doctor told him he most likely pulled a muscle and as the mass grew, he did not seek medical care. He continues to complain of minimal pain. He has a family history of breast cancer in her sister who lives in [facility]. He is otherwise healthy.   DM/US on 9/20/2024 shows a large mass identified in the right breast. 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  CT chest 12/10/2024:  An approximate 16 x 7.3 x 17.3 cm heterogeneous fat-containing mass located deep to the right pectoralis major muscle compatible with liposarcoma. Multiple adjacent subcentimeter axillary lymph nodes are noted, similar to contralateral left axilla lymph nodes  s/p right breast resection on 1/13/2025: Atypical lipomatous tumor, 15.5 x 13.5 x 7.3 cm. The tumor focally involves skeletal muscle and is submuscular per imaging.

## 2025-01-29 NOTE — PHYSICAL EXAM
[FreeTextEntry1] : SC present for exam  [de-identified] : right chest incision healing well, SUSAN still in place

## 2025-01-29 NOTE — ASSESSMENT
[FreeTextEntry1] : IMP: Lc is a 69 year old male who is newly diagnosed large right chest wall liposarcoma s/p right breast resection on 1/13/2025: Atypical lipomatous tumor, 15.5 x 13.5 x 7.3 cm. The tumor focally involves skeletal muscle and is submuscular per imaging.  SUSAN remains in place and plans to be removed next week by plastics  PLAN: Return to Dr. Clarke Return in one year Refer to Dr. Regan RT    All medical entries were at my, Dr. Enrico Wilson, direction. I have reviewed the chart and agree that the record accurately reflects my personal performance of the history, physical exam, assessment and plan. Our office Nurse Practitioner was present of the duration of the office visit.

## 2025-01-29 NOTE — HISTORY OF PRESENT ILLNESS
[de-identified] : Mr. Lc Garcia is a 69 year old male, referred by Dr. Jie Mao, regarding right chest wall liposarcoma. Here for a post op visit Pt is only Slovak speaking, prefers daughter to translate  Pt has a large palpable mass for 1.5 years. He noticed discomfort and small lump after doing pushups while in [place]. The [facility] doctor told him he most likely pulled a muscle and as the mass grew, he did not seek medical care. He continues to complain of minimal pain. He has a family history of breast cancer in her sister who lives in [facility]. He is otherwise healthy.   DM/US on 9/20/2024 shows a large mass identified in the right breast. 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  CT chest 12/10/2024:  An approximate 16 x 7.3 x 17.3 cm heterogeneous fat-containing mass located deep to the right pectoralis major muscle compatible with liposarcoma. Multiple adjacent subcentimeter axillary lymph nodes are noted, similar to contralateral left axilla lymph nodes  s/p right breast resection on 1/13/2025: Atypical lipomatous tumor, 15.5 x 13.5 x 7.3 cm. The tumor focally involves skeletal muscle and is submuscular per imaging.

## 2025-02-04 ENCOUNTER — APPOINTMENT (OUTPATIENT)
Dept: PLASTIC SURGERY | Facility: CLINIC | Age: 70
End: 2025-02-04
Payer: MEDICARE

## 2025-02-04 VITALS
SYSTOLIC BLOOD PRESSURE: 111 MMHG | HEIGHT: 62.5 IN | BODY MASS INDEX: 23.33 KG/M2 | HEART RATE: 72 BPM | TEMPERATURE: 98 F | WEIGHT: 130 LBS | OXYGEN SATURATION: 99 % | DIASTOLIC BLOOD PRESSURE: 68 MMHG

## 2025-02-04 PROCEDURE — 99024 POSTOP FOLLOW-UP VISIT: CPT

## 2025-02-04 NOTE — ASSESSMENT
[FreeTextEntry1] : REFUGIO ALCANTARA is a 69 year old M who is s/p reconstruction of right chest wall liposarcoma, possible local flap. DOS 1/13/25.  Patient is healing well, prineo removed, small open area to midpoint of incision, bacitraci nointment applied  - Once the brown tape falls off, Massage the incision site 2-3 times per day for 8-10 minutes with lotion, vitamin E, or cocoa butter to encourage blood flow and to decrease scar tissue formation. - Local wound care: midpoint of right chest wall incision, apply bacitracin ointment x 1 week, then transition to aquaphor/vaseline, samples provided - Continue compressive dressing - Continue to monitor SUSAN drain output - [facility] lymphedema therapy referral made the week prior - F/u 1 week to remove SUSAN drain

## 2025-02-04 NOTE — HISTORY OF PRESENT ILLNESS
[FreeTextEntry1] : REFUGIO ALCANTARA is a 69 year old M who is s/p reconstruction of right chest wall liposarcoma, possible local flap. DOS 1/13/25.  Patient's daughter, Breanna, was available to interpret via telephone.  Patient endorses itchiness to the right SUSAN drain site, otherwise denies cp, sob, subjective fever, chills, and sweats. No drain log available, however patient reports >30 cc's x 2 days, plan to keep in place for additional week.

## 2025-02-04 NOTE — PHYSICAL EXAM
[TextEntry] : Right chest wall: prineo removed, incision healing well, small wound dehiscence with slough to midpoint of incision, no gross signs of infection, no fluctuance bacitracin ointment applied to open area and brown tape applied to remainder of incision SUSAN drain dressing changed, good output new abd pad + ACE wrap applied for compression  resolved ecchymosis to the right axilla, +resolving post-op edema

## 2025-02-09 NOTE — HISTORY OF PRESENT ILLNESS
[de-identified] : 68 years old male with past medical history of BPH, CKD, hyperlipidemia, hypothyroidism, gout and GERD came back for follow up. Pt restarted taking levothyroxine 25 ug daily in one month ago and came back for follow up. 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 Pt is doing well now. Pt has been followed with surgery, last visit2/4/25.

## 2025-02-11 ENCOUNTER — APPOINTMENT (OUTPATIENT)
Dept: PLASTIC SURGERY | Facility: CLINIC | Age: 70
End: 2025-02-11
Payer: MEDICARE

## 2025-02-11 VITALS
HEIGHT: 62.5 IN | DIASTOLIC BLOOD PRESSURE: 68 MMHG | OXYGEN SATURATION: 99 % | WEIGHT: 130 LBS | BODY MASS INDEX: 23.33 KG/M2 | SYSTOLIC BLOOD PRESSURE: 109 MMHG | TEMPERATURE: 97.9 F | HEART RATE: 61 BPM

## 2025-02-11 DIAGNOSIS — Z98.890 OTHER SPECIFIED POSTPROCEDURAL STATES: ICD-10-CM

## 2025-02-11 PROCEDURE — 99024 POSTOP FOLLOW-UP VISIT: CPT

## 2025-02-11 NOTE — HISTORY OF PRESENT ILLNESS
[FreeTextEntry1] : REFUGIO ALCANTARA is a 69 year old M who is s/p reconstruction of right chest wall liposarcoma, possible local flap. DOS 1/13/25.  Patient is accompanied by his wife, his daughter Breanna was available via telephone.  Drain log reviewed, plan to remove SUSAN drain today. denies cp, sob, subjective fever, chills, and sweats.

## 2025-02-11 NOTE — ASSESSMENT
[FreeTextEntry1] : REFUGIO ALCANTARA is a 69 year old M who is s/p reconstruction of right chest wall liposarcoma, possible local flap. DOS 1/13/25.  Patient is healing well, small open area to midpoint of incision is improved, SUSAN removed, pressure dressing applied.   - Massage the incision site 2-3 times per day for 8-10 minutes with lotion, vitamin E, or cocoa butter to encourage blood flow and to decrease scar tissue formation. - Continue compressive dressing - Remove pressure dressing after 3 days, OK to shower, pat dry, do not scrub - [facility] lymphedema therapy referral made during previous visit - F/u 3-4 weeks

## 2025-02-11 NOTE — PHYSICAL EXAM
[TextEntry] : Right chest wall: incision is healing well, previous small wound dehiscence with slough is much improved compared to previous visit, no gross signs of infection, no fluctuance SUSAN drain removed, pressure dressing applied new abd pad + ACE wrap applied for compression  resolved ecchymosis to the right axilla, +resolving post-op edema

## 2025-02-13 ENCOUNTER — APPOINTMENT (OUTPATIENT)
Dept: RADIATION ONCOLOGY | Facility: CLINIC | Age: 70
End: 2025-02-13

## 2025-02-13 VITALS
HEART RATE: 60 BPM | TEMPERATURE: 96.98 F | HEIGHT: 62 IN | SYSTOLIC BLOOD PRESSURE: 112 MMHG | DIASTOLIC BLOOD PRESSURE: 70 MMHG | RESPIRATION RATE: 16 BRPM | OXYGEN SATURATION: 98 %

## 2025-02-13 DIAGNOSIS — Z86.39 PERSONAL HISTORY OF OTHER ENDOCRINE, NUTRITIONAL AND METABOLIC DISEASE: ICD-10-CM

## 2025-02-13 DIAGNOSIS — Z87.11 PERSONAL HISTORY OF PEPTIC ULCER DISEASE: ICD-10-CM

## 2025-02-13 DIAGNOSIS — Z87.39 PERSONAL HISTORY OF OTHER DISEASES OF THE MUSCULOSKELETAL SYSTEM AND CONNECTIVE TISSUE: ICD-10-CM

## 2025-02-13 PROCEDURE — 99204 OFFICE O/P NEW MOD 45 MIN: CPT

## 2025-02-13 RX ORDER — OMEPRAZOLE MAGNESIUM 20 MG/1
20 TABLET, DELAYED RELEASE ORAL
Refills: 0 | Status: ACTIVE | COMMUNITY
Start: 2025-02-13

## 2025-02-14 ENCOUNTER — APPOINTMENT (OUTPATIENT)
Dept: FAMILY MEDICINE | Facility: CLINIC | Age: 70
End: 2025-02-14

## 2025-02-14 DIAGNOSIS — N42.9 DISORDER OF PROSTATE, UNSPECIFIED: ICD-10-CM

## 2025-02-19 NOTE — HISTORY OF PRESENT ILLNESS
[FreeTextEntry1] : Ms. Garcia presents today for consultation.   He noted a large palpable mass for 1.5 years.   mammogram done 9/20/2024 showed: Ebony mass identified within the right breast. This is beyond the field of view of mammography. MRI breast with contrast recommended.   MRI breast 10/11/2024 showed: Right chest wall mass of uncertain etiology. The differential diagnosis includes, but is not limited to, benign etiology such as a hematoma, or a neoplastic process, either benign or malignant, including rhabdomyosarcoma. According to the referring healthcare provider Dr. Mao, there is no known history of trauma. No MRI evidence of malignancy of the left breast .  Right chest wall ultrasound guided biopsy done 10/24/2024 showed: Atyplical lipomatous tumor/well-differentiated liposarcoma  12/5/2024 CT abdomen pelvis showed: No evidence of metastatic disease or suspicious mass.  12/10/2024 CT chest showed: An approximate 16 x 7.3 x 17.3 cm heterogeneous fat-containing mass located deep to the right pectoralis major muscle compatible with liposarcoma. (2/34, 602/56). Multiple adjacent subcentimeter axillary lymph nodes are noted, similar to contralateral left axilla lymph nodes Adjacent pectoralis minor muscle, vascular structures and ribs are intact.. No current evidence of intrathoracic metastatic disease.  Pathology 1/13/2025  right breast resection showed: 1. Right (chest wall mass), resection: - Atypical lipomatous tumor (see note). - Tumor size is 15.5 x 13.5 x 7.3 cm. - The tumor focally involves skeletal muscle and is submuscular per imaging. - Mitotic rate is 1/50 HPFs. - Necrosis is not identified. - The tumor is enclosed in a fibromembrane which represents the margin. Note: Per the pathology record, this patient had a biopsy of this right chest wall mass reported as atypical lipomatous tumor/well-differentiated liposarcoma (see [id number]) with MDM2 gene amplification by fluorescent in situ hybridization (FISH) (see [id number]). 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  Today he is feeling well. Denies significant pain to operative site. does note some reduced mobility to arm.   No history of radiation no pacemakers or defibrillators

## 2025-02-19 NOTE — REASON FOR VISIT
[Consideration of Curative Therapy] : consideration of curative therapy for [Interpreters_IDNumber] : [id number], [id number]

## 2025-02-19 NOTE — HISTORY OF PRESENT ILLNESS
[FreeTextEntry1] : Ms. Garcia presents today for consultation.   He noted a large palpable mass for 1.5 years.   mammogram done 9/20/2024 showed: Ebony mass identified within the right breast. This is beyond the field of view of mammography. MRI breast with contrast recommended.   MRI breast 10/11/2024 showed: Right chest wall mass of uncertain etiology. The differential diagnosis includes, but is not limited to, benign etiology such as a hematoma, or a neoplastic process, either benign or malignant, including rhabdomyosarcoma. According to the referring healthcare provider Dr. Mao, there is no known history of trauma. No MRI evidence of malignancy of the left breast .  Right chest wall ultrasound guided biopsy done 10/24/2024 showed: Atyplical lipomatous tumor/well-differentiated liposarcoma  12/5/2024 CT abdomen pelvis showed: No evidence of metastatic disease or suspicious mass.  12/10/2024 CT chest showed: An approximate 16 x 7.3 x 17.3 cm heterogeneous fat-containing mass located deep to the right pectoralis major muscle compatible with liposarcoma. (2/34, 602/56). Multiple adjacent subcentimeter axillary lymph nodes are noted, similar to contralateral left axilla lymph nodes Adjacent pectoralis minor muscle, vascular structures and ribs are intact.. No current evidence of intrathoracic metastatic disease.  Pathology 1/13/2025  right breast resection showed: 1. Right (chest wall mass), resection: - Atypical lipomatous tumor (see note). - Tumor size is 15.5 x 13.5 x 7.3 cm. - The tumor focally involves skeletal muscle and is submuscular per imaging. - Mitotic rate is 1/50 HPFs. - Necrosis is not identified. - The tumor is enclosed in a fibromembrane which represents the margin. Note: Per the pathology record, this patient had a biopsy of this right chest wall mass reported as atypical lipomatous tumor/well-differentiated liposarcoma (see [id number]) with MDM2 gene amplification by fluorescent in situ hybridization (FISH) (see [id number]). 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  Today he is feeling well. Denies significant pain to operative site. does note some reduced mobility to arm.   No history of radiation no pacemakers or defibrillators

## 2025-02-19 NOTE — REVIEW OF SYSTEMS
[Negative] : Endocrine [Fever] : no fever [Chills] : no chills [Night Sweats] : no night sweats [Dysphagia] : no dysphagia [Chest Pain] : no chest pain [Palpitations] : no palpitations [Shortness Of Breath] : no shortness of breath [Abdominal Pain] : no abdominal pain [Vomiting] : no vomiting [Confused] : no confusion [Dizziness] : no dizziness [FreeTextEntry9] : notes some pain when he raises arms above head

## 2025-02-19 NOTE — REASON FOR VISIT
[Consideration of Curative Therapy] : consideration of curative therapy for [Interpreters_IDNumber] : [id number], [id number]

## 2025-02-19 NOTE — REASON FOR VISIT
[Consideration of Curative Therapy] : consideration of curative therapy for [Interpreters_IDNumber] : [id number], [id number]

## 2025-02-19 NOTE — HISTORY OF PRESENT ILLNESS
[FreeTextEntry1] : Ms. Garcia presents today for consultation.   He noted a large palpable mass for 1.5 years.   mammogram done 9/20/2024 showed: Ebony mass identified within the right breast. This is beyond the field of view of mammography. MRI breast with contrast recommended.   MRI breast 10/11/2024 showed: Right chest wall mass of uncertain etiology. The differential diagnosis includes, but is not limited to, benign etiology such as a hematoma, or a neoplastic process, either benign or malignant, including rhabdomyosarcoma. According to the referring healthcare provider Dr. Mao, there is no known history of trauma. No MRI evidence of malignancy of the left breast .  Right chest wall ultrasound guided biopsy done 10/24/2024 showed: Atyplical lipomatous tumor/well-differentiated liposarcoma  12/5/2024 CT abdomen pelvis showed: No evidence of metastatic disease or suspicious mass.  12/10/2024 CT chest showed: An approximate 16 x 7.3 x 17.3 cm heterogeneous fat-containing mass located deep to the right pectoralis major muscle compatible with liposarcoma. (2/34, 602/56). Multiple adjacent subcentimeter axillary lymph nodes are noted, similar to contralateral left axilla lymph nodes Adjacent pectoralis minor muscle, vascular structures and ribs are intact.. No current evidence of intrathoracic metastatic disease.  Pathology 1/13/2025  right breast resection showed: 1. Right (chest wall mass), resection: - Atypical lipomatous tumor (see note). - Tumor size is 15.5 x 13.5 x 7.3 cm. - The tumor focally involves skeletal muscle and is submuscular per imaging. - Mitotic rate is 1/50 HPFs. - Necrosis is not identified. - The tumor is enclosed in a fibromembrane which represents the margin. Note: Per the pathology record, this patient had a biopsy of this right chest wall mass reported as atypical lipomatous tumor/well-differentiated liposarcoma (see [id number]) with MDM2 gene amplification by fluorescent in situ hybridization (FISH) (see [id number]). 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  Today he is feeling well. Denies significant pain to operative site. does note some reduced mobility to arm.   No history of radiation no pacemakers or defibrillators

## 2025-02-22 ENCOUNTER — OUTPATIENT (OUTPATIENT)
Dept: OUTPATIENT SERVICES | Facility: HOSPITAL | Age: 70
LOS: 1 days | End: 2025-02-22
Payer: MEDICARE

## 2025-02-22 ENCOUNTER — APPOINTMENT (OUTPATIENT)
Dept: CT IMAGING | Facility: IMAGING CENTER | Age: 70
End: 2025-02-22

## 2025-02-22 DIAGNOSIS — R22.2 LOCALIZED SWELLING, MASS AND LUMP, TRUNK: ICD-10-CM

## 2025-02-22 PROCEDURE — 71260 CT THORAX DX C+: CPT

## 2025-02-22 PROCEDURE — 71260 CT THORAX DX C+: CPT | Mod: 26

## 2025-02-26 ENCOUNTER — APPOINTMENT (OUTPATIENT)
Dept: FAMILY MEDICINE | Facility: CLINIC | Age: 70
End: 2025-02-26
Payer: MEDICARE

## 2025-02-26 VITALS
HEART RATE: 67 BPM | SYSTOLIC BLOOD PRESSURE: 117 MMHG | WEIGHT: 134 LBS | BODY MASS INDEX: 24.66 KG/M2 | HEIGHT: 62 IN | TEMPERATURE: 97.2 F | OXYGEN SATURATION: 96 % | DIASTOLIC BLOOD PRESSURE: 69 MMHG

## 2025-02-26 DIAGNOSIS — K21.9 GASTRO-ESOPHAGEAL REFLUX DISEASE W/OUT ESOPHAGITIS: ICD-10-CM

## 2025-02-26 DIAGNOSIS — C49.3 MALIGNANT NEOPLASM OF CONNECTIVE AND SOFT TISSUE OF THORAX: ICD-10-CM

## 2025-02-26 DIAGNOSIS — M10.00 IDIOPATHIC GOUT, UNSPECIFIED SITE: ICD-10-CM

## 2025-02-26 DIAGNOSIS — N18.9 CHRONIC KIDNEY DISEASE, UNSPECIFIED: ICD-10-CM

## 2025-02-26 DIAGNOSIS — E78.2 MIXED HYPERLIPIDEMIA: ICD-10-CM

## 2025-02-26 DIAGNOSIS — Z23 ENCOUNTER FOR IMMUNIZATION: ICD-10-CM

## 2025-02-26 PROCEDURE — G0009: CPT

## 2025-02-26 PROCEDURE — 36415 COLL VENOUS BLD VENIPUNCTURE: CPT

## 2025-02-26 PROCEDURE — 90677 PCV20 VACCINE IM: CPT

## 2025-02-26 PROCEDURE — G2211 COMPLEX E/M VISIT ADD ON: CPT

## 2025-02-26 PROCEDURE — 99214 OFFICE O/P EST MOD 30 MIN: CPT | Mod: 25

## 2025-02-26 NOTE — HISTORY OF PRESENT ILLNESS
[de-identified] : 68 years old male with past medical history of BPH, CKD, hyperlipidemia, hypothyroidism, gout and GERD came back for follow up. Pt restarted taking levothyroxine 25 ug daily in one month ago and came back for follow up. 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 Pt had CT of chest last week, will follow up soon with surgery. Pt is doing well now. Pt has been followed with surgery, last visit2/4/25.

## 2025-02-26 NOTE — HISTORY OF PRESENT ILLNESS
[de-identified] : 68 years old male with past medical history of BPH, CKD, hyperlipidemia, hypothyroidism, gout and GERD came back for follow up. Pt restarted taking levothyroxine 25 ug daily in one month ago and came back for follow up. 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 Pt had CT of chest last week, will follow up soon with surgery. Pt is doing well now. Pt has been followed with surgery, last visit2/4/25.

## 2025-02-28 LAB
T3 SERPL-MCNC: 76 NG/DL
T3FREE SERPL-MCNC: 2.28 PG/ML
T4 FREE SERPL-MCNC: 0.8 NG/DL
T4 SERPL-MCNC: 6 UG/DL
TSH SERPL-ACNC: 7.06 UIU/ML

## 2025-03-06 ENCOUNTER — APPOINTMENT (OUTPATIENT)
Dept: RADIATION ONCOLOGY | Facility: CLINIC | Age: 70
End: 2025-03-06
Payer: MEDICARE

## 2025-03-06 VITALS
HEART RATE: 67 BPM | WEIGHT: 134.7 LBS | RESPIRATION RATE: 18 BRPM | BODY MASS INDEX: 24.79 KG/M2 | OXYGEN SATURATION: 97 % | HEIGHT: 62 IN | DIASTOLIC BLOOD PRESSURE: 58 MMHG | SYSTOLIC BLOOD PRESSURE: 103 MMHG | TEMPERATURE: 97.5 F

## 2025-03-06 PROCEDURE — 99215 OFFICE O/P EST HI 40 MIN: CPT

## 2025-03-10 NOTE — HISTORY OF PRESENT ILLNESS
[FreeTextEntry1] : Ms. Garcia 70 y/o male presents today for follow up.  No history of radiation No pacemakers or defibrillators. He noted a large palpable mass for 1.5 years.   Mammogram done 9/20/2024 showed: Ebony mass identified within the right breast. This is beyond the field of view of mammography. MRI breast with contrast recommended.   MRI breast 10/11/2024 showed: Right chest wall mass of uncertain etiology. The differential diagnosis includes, but is not limited to, benign etiology such as a hematoma, or a neoplastic process, either benign or malignant, including rhabdomyosarcoma. According to the referring healthcare provider Dr. Mao, there is no known history of trauma. No MRI evidence of malignancy of the left breast .  Right chest wall ultrasound guided biopsy done 10/24/2024 showed: Atyplical lipomatous tumor/well-differentiated liposarcoma  12/5/2024 CT abdomen pelvis showed: No evidence of metastatic disease or suspicious mass.  12/10/2024 CT chest showed: An approximate 16 x 7.3 x 17.3 cm heterogeneous fat-containing mass located deep to the right pectoralis major muscle compatible with liposarcoma. (2/34, 602/56). Multiple adjacent subcentimeter axillary lymph nodes are noted, similar to contralateral left axilla lymph nodes Adjacent pectoralis minor muscle, vascular structures and ribs are intact.. No current evidence of intrathoracic metastatic disease.  Pathology 1/13/2025  RIGHT breast resection showed: 1. Right (chest wall mass), resection: - Atypical lipomatous tumor (see note). - Tumor size is 15.5 x 13.5 x 7.3 cm. - The tumor focally involves skeletal muscle and is submuscular per imaging. - Mitotic rate is 1/50 HPFs. - Necrosis is not identified. - The tumor is enclosed in a fibromembrane which represents the margin.  At initial consult on 2/13/2025 he was feeling well.  2/22/25 CT Chest IMPRESSION: Status post resection of fat-containing right anterior chest wall mass. Right anterior chest wall skin thickening. Multiple subcentimeter axillary lymph nodes are not significantly changed from prior exam. No lung nodule.  3/06/25 Patient presents for follow up. Today he is feeling well. Denies significant pain to operative site. does note arm mobility is improving with time. notes some burning to operative site.

## 2025-03-10 NOTE — REVIEW OF SYSTEMS
[Negative] : Endocrine [Fever] : no fever [Chills] : no chills [Night Sweats] : no night sweats [Dysphagia] : no dysphagia [Chest Pain] : no chest pain [Palpitations] : no palpitations [Shortness Of Breath] : no shortness of breath [Abdominal Pain] : no abdominal pain [Vomiting] : no vomiting [Confused] : no confusion [Dizziness] : no dizziness [FreeTextEntry9] : notes some pain to shoulder when he raises arms above head

## 2025-03-10 NOTE — REASON FOR VISIT
[Routine Follow-Up] : routine follow-up visit for [Interpreters_IDNumber] : [id number], [id number] [FreeTextEntry3] :  offered, daughter erick interprets at his request

## 2025-03-10 NOTE — HISTORY OF PRESENT ILLNESS
[FreeTextEntry1] : Ms. Garcia 68 y/o male presents today for follow up.  No history of radiation No pacemakers or defibrillators. He noted a large palpable mass for 1.5 years.   Mammogram done 9/20/2024 showed: Ebony mass identified within the right breast. This is beyond the field of view of mammography. MRI breast with contrast recommended.   MRI breast 10/11/2024 showed: Right chest wall mass of uncertain etiology. The differential diagnosis includes, but is not limited to, benign etiology such as a hematoma, or a neoplastic process, either benign or malignant, including rhabdomyosarcoma. According to the referring healthcare provider Dr. Mao, there is no known history of trauma. No MRI evidence of malignancy of the left breast .  Right chest wall ultrasound guided biopsy done 10/24/2024 showed: Atyplical lipomatous tumor/well-differentiated liposarcoma  12/5/2024 CT abdomen pelvis showed: No evidence of metastatic disease or suspicious mass.  12/10/2024 CT chest showed: An approximate 16 x 7.3 x 17.3 cm heterogeneous fat-containing mass located deep to the right pectoralis major muscle compatible with liposarcoma. (2/34, 602/56). Multiple adjacent subcentimeter axillary lymph nodes are noted, similar to contralateral left axilla lymph nodes Adjacent pectoralis minor muscle, vascular structures and ribs are intact.. No current evidence of intrathoracic metastatic disease.  Pathology 1/13/2025  RIGHT breast resection showed: 1. Right (chest wall mass), resection: - Atypical lipomatous tumor (see note). - Tumor size is 15.5 x 13.5 x 7.3 cm. - The tumor focally involves skeletal muscle and is submuscular per imaging. - Mitotic rate is 1/50 HPFs. - Necrosis is not identified. - The tumor is enclosed in a fibromembrane which represents the margin.  At initial consult on 2/13/2025 he was feeling well.  2/22/25 CT Chest IMPRESSION: Status post resection of fat-containing right anterior chest wall mass. Right anterior chest wall skin thickening. Multiple subcentimeter axillary lymph nodes are not significantly changed from prior exam. No lung nodule.  3/06/25 Patient presents for follow up. Today he is feeling well. Denies significant pain to operative site. does note arm mobility is improving with time. notes some burning to operative site.

## 2025-03-10 NOTE — REASON FOR VISIT
[Routine Follow-Up] : routine follow-up visit for [Interpreters_IDNumber] : [id number], [id number] [FreeTextEntry3] :  offered, daughter erick interprets at his request

## 2025-03-10 NOTE — REASON FOR VISIT
[Routine Follow-Up] : routine follow-up visit for [Interpreters_IDNumber] : [id number], [id number] [FreeTextEntry3] :  offered, daughter erick interprets at his request

## 2025-03-13 ENCOUNTER — APPOINTMENT (OUTPATIENT)
Dept: PLASTIC SURGERY | Facility: CLINIC | Age: 70
End: 2025-03-13
Payer: MEDICARE

## 2025-03-13 DIAGNOSIS — C49.3 MALIGNANT NEOPLASM OF CONNECTIVE AND SOFT TISSUE OF THORAX: ICD-10-CM

## 2025-03-13 DIAGNOSIS — Z98.890 OTHER SPECIFIED POSTPROCEDURAL STATES: ICD-10-CM

## 2025-03-13 PROCEDURE — 99024 POSTOP FOLLOW-UP VISIT: CPT

## 2025-03-13 NOTE — PHYSICAL EXAM
[TextEntry] : Right chest wall: incision is healing well, no gross signs of infection, no fluctuance photos taken

## 2025-03-13 NOTE — HISTORY OF PRESENT ILLNESS
[FreeTextEntry1] : REFUGIO ALCANTARA is a 69 year old M who is s/p reconstruction of right chest wall liposarcoma, possible local flap. DOS 1/13/25.  Patient is accompanied by his wife.  He offers no acute complaints.  Does not require chemo or radiation  denies cp, sob, subjective fever, chills, and sweats.

## 2025-03-13 NOTE — ADDENDUM
[FreeTextEntry1] :  This note was authored by Mariah Marrufo working as a scribe for Dr.Victor Clarke. I, Dr. Vitaliy Clarke have reviewed the content of this note and confirm it is true and accurate. I personally performed the history and physical examination and made all the decisions 03/13/2025

## 2025-03-13 NOTE — ASSESSMENT
[FreeTextEntry1] : REFUGIO ALCANTARA is a 69 year old M who is s/p reconstruction of right chest wall liposarcoma, possible local flap. DOS 1/13/25.  Patient is healing well.   - Massage the incision site 2-3 times per day for 8-10 minutes with lotion, vitamin E, or cocoa butter to encourage blood flow and to decrease scar tissue formation. - F/u PRN

## 2025-03-14 ENCOUNTER — APPOINTMENT (OUTPATIENT)
Dept: FAMILY MEDICINE | Facility: CLINIC | Age: 70
End: 2025-03-14
Payer: MEDICARE

## 2025-03-14 VITALS — SYSTOLIC BLOOD PRESSURE: 118 MMHG | TEMPERATURE: 98.3 F | DIASTOLIC BLOOD PRESSURE: 76 MMHG

## 2025-03-14 DIAGNOSIS — J30.9 ALLERGIC RHINITIS, UNSPECIFIED: ICD-10-CM

## 2025-03-14 DIAGNOSIS — E03.9 HYPOTHYROIDISM, UNSPECIFIED: ICD-10-CM

## 2025-03-14 PROCEDURE — G2211 COMPLEX E/M VISIT ADD ON: CPT

## 2025-03-14 PROCEDURE — 99213 OFFICE O/P EST LOW 20 MIN: CPT

## 2025-03-14 RX ORDER — FEXOFENADINE HYDROCHLORIDE 180 MG/1
180 TABLET ORAL DAILY
Qty: 30 | Refills: 3 | Status: ACTIVE | COMMUNITY
Start: 2025-03-14 | End: 1900-01-01

## 2025-03-14 NOTE — HISTORY OF PRESENT ILLNESS
[de-identified] : 68 years old male with past medical history of BPH, CKD, hyperlipidemia, hypothyroidism, gout and GERD came back to review his most recent test results. TSH was 7.06, total T3 76, free T4 0.8. Results discussed with pt and pt's daughter in details today. Pt has seasonal allergy symptoms, bought allegra D 24 hours, taking 1 daily. Pt felt palpitation with this medication.

## 2025-03-14 NOTE — HISTORY OF PRESENT ILLNESS
[de-identified] : 68 years old male with past medical history of BPH, CKD, hyperlipidemia, hypothyroidism, gout and GERD came back to review his most recent test results. TSH was 7.06, total T3 76, free T4 0.8. Results discussed with pt and pt's daughter in details today. Pt has seasonal allergy symptoms, bought allegra D 24 hours, taking 1 daily. Pt felt palpitation with this medication.

## 2025-04-05 NOTE — HISTORY OF PRESENT ILLNESS
[de-identified] : 68 years old male with past medical history of BPH, CKD, hyperlipidemia, hypothyroidism, gout and GERD came back for follow up.  Levothyroxine dose was increased to 50 ug daily in one month ago. Pt came back for follow up.

## 2025-04-11 ENCOUNTER — APPOINTMENT (OUTPATIENT)
Dept: FAMILY MEDICINE | Facility: CLINIC | Age: 70
End: 2025-04-11

## 2025-04-11 DIAGNOSIS — E03.9 HYPOTHYROIDISM, UNSPECIFIED: ICD-10-CM

## 2025-05-12 ENCOUNTER — NON-APPOINTMENT (OUTPATIENT)
Age: 70
End: 2025-05-12

## 2025-05-18 NOTE — HISTORY OF PRESENT ILLNESS
[de-identified] : 68 years old male with past medical history of BPH, CKD, hyperlipidemia, hypothyroidism, gout and GERD came back for follow up.  Levothyroxine dose was increased to 50 ug daily in 2 months ago. Pt came back for follow up.

## 2025-05-23 ENCOUNTER — APPOINTMENT (OUTPATIENT)
Dept: FAMILY MEDICINE | Facility: CLINIC | Age: 70
End: 2025-05-23

## 2025-05-23 DIAGNOSIS — N42.9 DISORDER OF PROSTATE, UNSPECIFIED: ICD-10-CM

## 2025-05-23 DIAGNOSIS — C49.3 MALIGNANT NEOPLASM OF CONNECTIVE AND SOFT TISSUE OF THORAX: ICD-10-CM

## 2025-05-23 DIAGNOSIS — N18.9 CHRONIC KIDNEY DISEASE, UNSPECIFIED: ICD-10-CM

## 2025-05-23 DIAGNOSIS — J30.9 ALLERGIC RHINITIS, UNSPECIFIED: ICD-10-CM

## 2025-05-23 DIAGNOSIS — K21.9 GASTRO-ESOPHAGEAL REFLUX DISEASE W/OUT ESOPHAGITIS: ICD-10-CM

## 2025-05-23 DIAGNOSIS — E03.9 HYPOTHYROIDISM, UNSPECIFIED: ICD-10-CM

## 2025-05-23 DIAGNOSIS — E78.2 MIXED HYPERLIPIDEMIA: ICD-10-CM

## 2025-05-23 DIAGNOSIS — M10.00 IDIOPATHIC GOUT, UNSPECIFIED SITE: ICD-10-CM

## 2025-06-16 ENCOUNTER — NON-APPOINTMENT (OUTPATIENT)
Age: 70
End: 2025-06-16

## 2025-06-21 NOTE — HISTORY OF PRESENT ILLNESS
[de-identified] : 68 years old male with past medical history of BPH, CKD, hyperlipidemia, hypothyroidism, gout and GERD came back for follow up.  Levothyroxine dose was increased to 50 ug daily in 2 months ago. Pt came back for follow up.

## 2025-06-27 ENCOUNTER — APPOINTMENT (OUTPATIENT)
Dept: FAMILY MEDICINE | Facility: CLINIC | Age: 70
End: 2025-06-27

## 2025-07-05 ENCOUNTER — NON-APPOINTMENT (OUTPATIENT)
Age: 70
End: 2025-07-05

## 2025-09-21 ENCOUNTER — NON-APPOINTMENT (OUTPATIENT)
Age: 70
End: 2025-09-21

## (undated) DEVICE — SUT CHROMIC 4-0 27" SH

## (undated) DEVICE — SOL IRR POUR NS 0.9% 1500ML

## (undated) DEVICE — WARMING BLANKET UPPER ADULT

## (undated) DEVICE — DRSG DERMABOND PRINEO 42CM

## (undated) DEVICE — ELCTR BOVIE TIP BLADE INSULATED 2.75" EDGE

## (undated) DEVICE — Device

## (undated) DEVICE — DRSG KERLIX ROLL LG 4.5"

## (undated) DEVICE — DRSG ADAPTIC 3 X 8"

## (undated) DEVICE — DRSG XEROFORM 5 X 9"

## (undated) DEVICE — LABELS BLANK W PEN

## (undated) DEVICE — LIJ-ZIMMER MESHGRAFTER: Type: DURABLE MEDICAL EQUIPMENT

## (undated) DEVICE — STAPLER SKIN VISI-STAT 35 WIDE

## (undated) DEVICE — DRSG BIOPATCH DISK W CHG 1" W 4.0MM HOLE

## (undated) DEVICE — SOL IRR POUR H2O 1500ML

## (undated) DEVICE — POSITIONER STRAP ARMBOARD VELCRO TS-30

## (undated) DEVICE — ELCTR GROUNDING PAD ADULT COVIDIEN

## (undated) DEVICE — VENODYNE/SCD SLEEVE CALF MEDIUM

## (undated) DEVICE — DRAPE INSTRUMENT POUCH 6.75" X 11"

## (undated) DEVICE — GLV 8 PROTEXIS (WHITE)

## (undated) DEVICE — LIGASURE EXACT DISSECTOR

## (undated) DEVICE — DRAPE SPLIT SHEET 77" X 108"

## (undated) DEVICE — PACK MAJOR ABDOMINAL WITH LAP

## (undated) DEVICE — CANISTER DISPOSABLE THIN WALL 3000CC